# Patient Record
Sex: MALE | Race: WHITE | NOT HISPANIC OR LATINO | Employment: FULL TIME | URBAN - METROPOLITAN AREA
[De-identification: names, ages, dates, MRNs, and addresses within clinical notes are randomized per-mention and may not be internally consistent; named-entity substitution may affect disease eponyms.]

---

## 2017-12-18 RX ORDER — MUPIROCIN 20 MG/G
OINTMENT TOPICAL
COMMUNITY
Start: 2017-10-10 | End: 2017-12-19

## 2017-12-18 RX ORDER — MESALAMINE 1.2 G/1
TABLET, DELAYED RELEASE ORAL
COMMUNITY
Start: 2017-12-13

## 2017-12-18 RX ORDER — HYDROCODONE BITARTRATE AND ACETAMINOPHEN 5; 325 MG/1; MG/1
TABLET ORAL
COMMUNITY
Start: 2017-10-10 | End: 2017-12-19

## 2017-12-18 RX ORDER — AZATHIOPRINE 50 MG/1
TABLET ORAL
COMMUNITY
Start: 2017-12-05

## 2017-12-19 ENCOUNTER — OFFICE VISIT (OUTPATIENT)
Dept: PODIATRY | Facility: CLINIC | Age: 52
End: 2017-12-19
Payer: COMMERCIAL

## 2017-12-19 VITALS — WEIGHT: 195.31 LBS | BODY MASS INDEX: 27.34 KG/M2 | HEIGHT: 71 IN

## 2017-12-19 DIAGNOSIS — M72.2 PLANTAR FASCIITIS OF RIGHT FOOT: ICD-10-CM

## 2017-12-19 DIAGNOSIS — M21.6X2 ACQUIRED EQUINUS DEFORMITY OF BOTH FEET: Primary | ICD-10-CM

## 2017-12-19 DIAGNOSIS — M21.6X1 ACQUIRED EQUINUS DEFORMITY OF BOTH FEET: Primary | ICD-10-CM

## 2017-12-19 DIAGNOSIS — M72.2 PLANTAR FASCIITIS OF LEFT FOOT: ICD-10-CM

## 2017-12-19 PROCEDURE — 99203 OFFICE O/P NEW LOW 30 MIN: CPT | Mod: S$GLB,,, | Performed by: PODIATRIST

## 2017-12-19 PROCEDURE — 99999 PR PBB SHADOW E&M-EST. PATIENT-LVL III: CPT | Mod: PBBFAC,,, | Performed by: PODIATRIST

## 2017-12-19 RX ORDER — DICLOFENAC SODIUM 10 MG/G
2 GEL TOPICAL 4 TIMES DAILY
Qty: 1 TUBE | Refills: 2 | Status: SHIPPED | OUTPATIENT
Start: 2017-12-19 | End: 2018-01-23

## 2017-12-19 NOTE — PATIENT INSTRUCTIONS
1. Stretch calf and plantar fascia 10x per day for 30 sec and before getting out of bed.    2. Supportive shoes at all times (athletic shoe including nava, new balance, asics, HOKA or casual shoes like Dansko, Holley, Naot, Vionoic, Fit flop  clog or wedge with extra heel padding and arch support.    (Varsity sports, Phidippides, [place] running company, Masseys, Goodfeet, Cantilever, Feet First, Foot Solutions, Therapeutic shoes, SAS, [facility] [employer] center pro shop) [email or web address]    3. Orthotic (recommend the following brands: Superfeet, Spenco, Powerstep, Sof Sole Fit Series)              4. NSAID's for 2-3 weeks if no GI or Kidney problems (example: ibuprofen 600 mg 2 x per day)    5. ICE massage with frozen water bottle 2x per day for 30 minutes.    6. Consider night splint, custom orthotics, therapy and/or steroid injection    What Is Plantar Fasciitis?   The plantar fascia is a ligament-like band running from your heel to the ball of your foot. This band pulls on the heel bone, raising the arch of your foot as it pushes off the ground. But if your foot moves incorrectly, the plantar fascia may become strained. The fascia may swell and its tiny fibers may begin to fray, causing plantar fasciitis.  Causes  Plantar fasciitis is often caused by poor foot mechanics. If your foot flattens too much, the fascia may overstretch and swell. If your foot flattens too little, the fascia may ache from being pulled too tight.    The plantar fascia is a thick, fibrous layer of tissue that covers the bones on the bottom of your foot. It holds the foot bones in an arched position. Plantar fasciitis is a painful swelling of the plantar fascia.  A heel spur is an overgrowth of bone where the plantar fascia attaches to the heel bone. The heel spur itself usually doesnt cause pain. However, the heel spur might be a sign of plantar fasciitis which may cause your foot pain. There is no specific treatment for heel  spurs.   Plantar fasciitis can develop slowly or suddenly. It usually affects one foot at a time. Heel pain can feel sharp, like a knife sticking into the bottom of your foot. You may feel pain after exercising, long-distance jogging, stair climbing, long periods of standing, or after standing up.  Risk factors for plantar fasciitis include: arthritis, diabetes, obesity or recent weight gain, flat foot, and having high arches. Wearing high heels, loose shoes, or shoes with poor arch support adds to the risk.    Foot pain is usually worse in the morning. But it improves with walking. By the end of the day there may be a dull aching. Treatment includes short-term rest and controlling inflammation. It may take up to 9 months before all symptoms go away. In rare cases, a steroid injection in the foot, or surgery, may be needed.  Home care  · If you are overweight, lose weight to help healing.  · Choose supportive shoes with good arch support and shock absorbency. Replace athletic shoes when they become worn out. Dont walk or run barefoot.  · Premade or custom-fitted shoe inserts may be helpful. Inserts made of silicone seem to be the most effective. Custom-made inserts can be provided by a podiatrist or foot specialist, physical therapist, or orthopedist.  · Premade or custom-made night splints keep the heel stretched out while you sleep. They may prevent morning pain.  · Avoid activities that stress the feet: jogging, prolonged standing or walking, contact sports, etc.  · First thing in the morning and before sports, stretch the bottom of your foot. Gently flex your ankle so the toes move toward your knee.  · Icing may help control heel pain. Apply an ice pack to the heel for 10-20 minutes as a preventive. Or ice your heel after a severe flare-up of symptoms. You may repeat this every 1-2 hours as needed.  · You may use over-the-counter pain medicine to control pain, unless another medicine was  prescribed. Anti-inflammatory pain medicines, such as ibuprofen or naproxen, may work better than acetaminophen. If you have chronic liver or kidney disease or ever had a stomach ulcer or GI bleeding, talk with your healthcare provider before using these medicines.  · Shoe inserts, a night splint, or a special boot may be needed. Use these as directed by your healthcare provider.      Treating Plantar Fasciitis    First, your doctor relieves pain. Then, the cause of your problem may be found and corrected. If your pain is due to poor foot mechanics, custom-made shoe inserts (orthoses) may help.    Reduce Symptoms:  · To relieve mild symptoms, try aspirin, ibuprofen, or other medications as directed. Rubbing ice on the affected area may also help.  · To reduce severe pain and swelling, your doctor may prescribe pills or injections or a walking cast in some instances. Physical therapy, such as ultrasound or a daily stretching program, may also be recommended. Surgery is rarely required.  · To reduce symptoms caused by poor foot mechanics, your foot may be taped. This supports the arch and temporarily controls movement. Night splints may also help by stretching the fascia.    Control Movement  If taping helps, your doctor may prescribe orthoses. Built from plaster casts of your feet, these inserts control the way your foot moves. As a result, your symptoms should go away.  If Surgery Is Needed  Your doctor may consider surgery if other types of treatment don't control your pain. During surgery, the plantar fascia is partially cut to release tension. As you heal, fibrous tissue fills the space between the heel bone and the plantar fascia.   Reduce Overuse  Every time your foot strikes the ground, the plantar fascia is stretched. You can reduce the strain on the plantar fascia and the possibility of overuse by following these suggestions:  · Lose any excess weight.  · Avoid running on hard or uneven ground.  · Use  orthoses at all times in your shoes and house slippers.  © 7230-0661 [employer]. [address] [place] [address]. All rights reserved. This information is not intended as a substitute for professional medical care. Always follow your healthcare professional's instructions.            Lower Body Exercises: Calf Stretch    This exercise both stretches and strengthens your lower body to help your back. Do the exercise as often as suggested by your health care provider. As you work out, dont rush or strain. Use an exercise mat, pillow, or folded towel to protect your knees and other sensitive areas.  · Face a wall 2 feet away. Step toward the wall with one foot.  · Place both palms on the wall and bend your front knee.  · Lean forward, keeping the back leg straight and the heel on the floor.  · Hold for 20 seconds. Switch legs.  © 6671-3749 [employer]. [address] [place] [address]. All rights reserved. This information is not intended as a substitute for professional medical care. Always follow your healthcare professional's instructions.      These instructions are for your right foot. Switch sides for your left foot.  1. Sit in a chair. Rest your right ankle on your left knee.  2. Hold your toes with your right hand. Gently bend the toes backward. Feel a stretch in the undersides of the toes and ball of the foot. Hold for 30 to 60 seconds.  3. Then gently bend the toes in the other direction. Gently press on them until your foot is pointed. Hold for 30 to 60 seconds.  4. Repeat 5 times, or as instructed.  Date Last Reviewed: 5/1/2016  © 0474-2502 [employer]. [address] [place] [address]. All rights reserved. This information is not intended as a substitute for professional medical care. Always follow your healthcare professional's instructions.

## 2017-12-21 NOTE — PROGRESS NOTES
Subjective:      Patient ID: Devan Celeste is a 52 y.o. male.    Chief Complaint: Heel Pain (both feet x 7 mths) and Other Misc (Dr. Mclean about 5 years ago or more)    Devan is a 52 y.o. male who presents to the clinic complaining of heel pain in both feet, especially with the first step in the morning but also aches more the more he is on it throughout the day. The pain is described as Aching and Sharp. The onset of the pain was gradual and has worsened over the past several weeks. Devan rates the pain as 2/10. He denies a history of trauma. Prior treatments include Rest.    Review of Systems   Constitution: Negative for chills and fever.   Cardiovascular: Negative for claudication and leg swelling.   Respiratory: Negative for shortness of breath.    Skin: Negative for itching, nail changes and rash.   Musculoskeletal: Negative for muscle cramps, muscle weakness and myalgias.        Bilateral heel pain   Gastrointestinal: Negative for nausea and vomiting.   Neurological: Negative for focal weakness, loss of balance, numbness and paresthesias.           Objective:      Physical Exam   Constitutional: He is oriented to person, place, and time. He appears well-developed and well-nourished. No distress.   Cardiovascular:   Pulses:       Dorsalis pedis pulses are 2+ on the right side, and 2+ on the left side.        Posterior tibial pulses are 2+ on the right side, and 2+ on the left side.   < 3 sec capillary refill time to toes 1-5 bilateral. Toes and feet are warm to touch proximally with normal distal cooling b/l. There is some hair growth on the feet and toes b/l. There is no edema b/l. No spider veins or varicosities present b/l.      Musculoskeletal:   Mild pain on palpation plantar medial and central heel bilateral. No pain with ROM or MMT. No pain with medial and lateral compression of heel.    Equinus noted b/l ankles with < 10 deg DF noted. MMT 5/5 in DF/PF/Inv/Ev resistance with no reproduction of pain in  any direction. Passive range of motion of ankle and pedal joints is painless b/l.     Neurological: He is alert and oriented to person, place, and time. He has normal strength. He displays no atrophy and no tremor. No sensory deficit. He exhibits normal muscle tone.   Negative tinel sign bilateral.   Skin: Skin is warm, dry and intact. No abrasion, no bruising, no burn, no ecchymosis, no laceration, no lesion, no petechiae and no rash noted. He is not diaphoretic. No cyanosis or erythema. No pallor. Nails show no clubbing.   Skin temperature, texture and turgor within normal limits.   Psychiatric: He has a normal mood and affect. His behavior is normal.             Assessment:       Encounter Diagnoses   Name Primary?    Acquired equinus deformity of both feet Yes    Plantar fasciitis of right foot     Plantar fasciitis of left foot          Plan:       Devan was seen today for heel pain and other misc.    Diagnoses and all orders for this visit:    Acquired equinus deformity of both feet    Plantar fasciitis of right foot    Plantar fasciitis of left foot    Other orders  -     diclofenac sodium (VOLTAREN) 1 % Gel; Apply 2 g topically 4 (four) times daily.      I counseled the patient on his conditions, their implications and medical management.    Patient will stretch the tendo achilles complex three times daily as demonstrated in the office.  Literature was dispensed illustrating proper stretching technique.    Patient will obtain over the counter arch supports and wear them in shoes whenever possible, can also be worn in his work boots.  Athletic shoes intended for walking or running are usually best.    Patient instructed on adequate icing techniques. Patient should ice the affected area at least once per day x 10 minutes for 10 days . I advised the  patient that extra icing would also be beneficial to ensure adequate anti inflammatory effect     Avoid walking in flats or barefoot    Return in 1 month for  follow up, consider injection, splints and PT if pain persists or worsens.    Hao Lopes DPM

## 2018-01-23 ENCOUNTER — OFFICE VISIT (OUTPATIENT)
Dept: PODIATRY | Facility: CLINIC | Age: 53
End: 2018-01-23
Payer: COMMERCIAL

## 2018-01-23 VITALS — HEIGHT: 71 IN | WEIGHT: 195.56 LBS | BODY MASS INDEX: 27.38 KG/M2

## 2018-01-23 DIAGNOSIS — M72.2 PLANTAR FASCIITIS OF RIGHT FOOT: ICD-10-CM

## 2018-01-23 DIAGNOSIS — M21.6X1 ACQUIRED EQUINUS DEFORMITY OF BOTH FEET: Primary | ICD-10-CM

## 2018-01-23 DIAGNOSIS — M72.2 PLANTAR FASCIITIS OF LEFT FOOT: ICD-10-CM

## 2018-01-23 DIAGNOSIS — M21.6X2 ACQUIRED EQUINUS DEFORMITY OF BOTH FEET: Primary | ICD-10-CM

## 2018-01-23 PROCEDURE — 99999 PR PBB SHADOW E&M-EST. PATIENT-LVL III: CPT | Mod: PBBFAC,,, | Performed by: PODIATRIST

## 2018-01-23 PROCEDURE — 20550 NJX 1 TENDON SHEATH/LIGAMENT: CPT | Mod: 50,S$GLB,, | Performed by: PODIATRIST

## 2018-01-23 PROCEDURE — 99499 UNLISTED E&M SERVICE: CPT | Mod: S$GLB,,, | Performed by: PODIATRIST

## 2018-01-23 RX ORDER — TRIAMCINOLONE ACETONIDE 40 MG/ML
40 INJECTION, SUSPENSION INTRA-ARTICULAR; INTRAMUSCULAR ONCE
Status: COMPLETED | OUTPATIENT
Start: 2018-01-23 | End: 2018-01-23

## 2018-01-23 RX ORDER — BACITRACIN 500 [USP'U]/G
OINTMENT TOPICAL 2 TIMES DAILY
COMMUNITY

## 2018-01-23 RX ORDER — DEXAMETHASONE SODIUM PHOSPHATE 4 MG/ML
4 INJECTION, SOLUTION INTRA-ARTICULAR; INTRALESIONAL; INTRAMUSCULAR; INTRAVENOUS; SOFT TISSUE
Status: COMPLETED | OUTPATIENT
Start: 2018-01-23 | End: 2018-01-23

## 2018-01-23 RX ADMIN — TRIAMCINOLONE ACETONIDE 40 MG: 40 INJECTION, SUSPENSION INTRA-ARTICULAR; INTRAMUSCULAR at 04:01

## 2018-01-23 RX ADMIN — DEXAMETHASONE SODIUM PHOSPHATE 4 MG: 4 INJECTION, SOLUTION INTRA-ARTICULAR; INTRALESIONAL; INTRAMUSCULAR; INTRAVENOUS; SOFT TISSUE at 04:01

## 2018-01-27 NOTE — PROGRESS NOTES
Subjective:      Patient ID: Devan Celeste is a 52 y.o. male.    Chief Complaint: Follow-up (1 mo re-ck - slight improvement); Heel Pain (shyla); and Other Misc (PCP:  Dr Mclean  about 5 years ago)    Devan is a 52 y.o. male who presents to the clinic complaining of heel pain in both feet, especially with the first step in the morning but also aches more the more he is on it throughout the day. The pain is described as Aching and Sharp. The onset of the pain was gradual and has worsened over the past several weeks. Devan rates the pain as 2/10. He denies a history of trauma. Prior treatments include Rest.    1/23/18: Patient was seen for follow up for bilateral plantar fasciitis. He relates getting new shoes and inserts and stretching, there has been little improvement over the last month, still wears his work boots and even with the inserts they hurt his heels. No new pedal complaints at this time.    Review of Systems   Constitution: Negative for chills and fever.   Cardiovascular: Negative for claudication and leg swelling.   Respiratory: Negative for shortness of breath.    Skin: Negative for itching, nail changes and rash.   Musculoskeletal: Negative for muscle cramps, muscle weakness and myalgias.        Bilateral heel pain   Gastrointestinal: Negative for nausea and vomiting.   Neurological: Negative for focal weakness, loss of balance, numbness and paresthesias.           Objective:      Physical Exam   Constitutional: He is oriented to person, place, and time. He appears well-developed and well-nourished. No distress.   Cardiovascular:   Pulses:       Dorsalis pedis pulses are 2+ on the right side, and 2+ on the left side.        Posterior tibial pulses are 2+ on the right side, and 2+ on the left side.   < 3 sec capillary refill time to toes 1-5 bilateral. Toes and feet are warm to touch proximally with normal distal cooling b/l. There is some hair growth on the feet and toes b/l. There is no edema b/l. No  spider veins or varicosities present b/l.      Musculoskeletal:   Mild pain on palpation plantar medial and central heel bilateral. No pain with ROM or MMT. No pain with medial and lateral compression of heel.    Equinus noted b/l ankles with < 10 deg DF noted. MMT 5/5 in DF/PF/Inv/Ev resistance with no reproduction of pain in any direction. Passive range of motion of ankle and pedal joints is painless b/l.     Neurological: He is alert and oriented to person, place, and time. He has normal strength. He displays no atrophy and no tremor. No sensory deficit. He exhibits normal muscle tone.   Negative tinel sign bilateral.   Skin: Skin is warm, dry and intact. No abrasion, no bruising, no burn, no ecchymosis, no laceration, no lesion, no petechiae and no rash noted. He is not diaphoretic. No cyanosis or erythema. No pallor. Nails show no clubbing.   Skin temperature, texture and turgor within normal limits.   Psychiatric: He has a normal mood and affect. His behavior is normal.             Assessment:       Encounter Diagnoses   Name Primary?    Acquired equinus deformity of both feet Yes    Plantar fasciitis of right foot     Plantar fasciitis of left foot          Plan:       Devan was seen today for follow-up, heel pain and other misc.    Diagnoses and all orders for this visit:    Acquired equinus deformity of both feet    Plantar fasciitis of right foot    Plantar fasciitis of left foot    Other orders  -     dexamethasone injection 4 mg; Inject 1 mL (4 mg total) into the muscle one time.  -     triamcinolone acetonide injection 40 mg; Inject 1 mL (40 mg total) into the muscle once.      I counseled the patient on his conditions, their implications and medical management.    Patient will continue to stretch the tendo achilles complex three times daily as demonstrated in the office.  Literature was dispensed illustrating proper stretching technique.    Patient will wear the over the counter arch supports and  wear them in shoes whenever possible, can also be worn in his work boots.  Athletic shoes intended for walking or running are usually best.    Patient instructed on adequate icing techniques. Patient should ice the affected area at least once per day x 10 minutes for 10 days . I advised the  patient that extra icing would also be beneficial to ensure adequate anti inflammatory effect     Avoid walking in flats or barefoot    Conservative vs surgical treatment options for Plantar Fasciitis were explained in detail. Today the patient received an injection in plantar bilateral heels. The area was prepped with alcohol, skin anesthestized with cold spray and a mixture of 20 mg kenalog, 2mg dexamethasone 1 mL 1% plain lidocaine was injected into each heel. The patient tolerated the procedure well. Instructed to rest, ice and elevate.    Offered prescription for PT which he turned down.    Return in 1 month for follow up, consider, splints and PT if pain persists or worsens.    Hao Lopes DPM

## 2018-04-30 ENCOUNTER — HOSPITAL ENCOUNTER (OUTPATIENT)
Dept: RADIOLOGY | Facility: CLINIC | Age: 53
Discharge: HOME | End: 2018-04-30
Attending: PODIATRIST
Payer: COMMERCIAL

## 2018-04-30 ENCOUNTER — OFFICE VISIT (OUTPATIENT)
Dept: PODIATRY | Facility: CLINIC | Age: 53
End: 2018-04-30
Payer: COMMERCIAL

## 2018-04-30 VITALS — HEIGHT: 71 IN | WEIGHT: 199.5 LBS | BODY MASS INDEX: 27.93 KG/M2

## 2018-04-30 DIAGNOSIS — M21.6X1 ACQUIRED EQUINUS DEFORMITY OF BOTH FEET: ICD-10-CM

## 2018-04-30 DIAGNOSIS — M21.6X2 ACQUIRED EQUINUS DEFORMITY OF BOTH FEET: ICD-10-CM

## 2018-04-30 DIAGNOSIS — M72.2 PLANTAR FASCIITIS OF RIGHT FOOT: Primary | ICD-10-CM

## 2018-04-30 DIAGNOSIS — M79.671 FOOT PAIN, RIGHT: ICD-10-CM

## 2018-04-30 DIAGNOSIS — M72.2 PLANTAR FASCIITIS OF RIGHT FOOT: ICD-10-CM

## 2018-04-30 PROCEDURE — 20550 NJX 1 TENDON SHEATH/LIGAMENT: CPT | Mod: RT,S$GLB,, | Performed by: PODIATRIST

## 2018-04-30 PROCEDURE — 99213 OFFICE O/P EST LOW 20 MIN: CPT | Mod: 25,S$GLB,, | Performed by: PODIATRIST

## 2018-04-30 PROCEDURE — 99999 PR PBB SHADOW E&M-EST. PATIENT-LVL III: CPT | Mod: PBBFAC,,, | Performed by: PODIATRIST

## 2018-04-30 PROCEDURE — 73630 X-RAY EXAM OF FOOT: CPT | Mod: TC,FY,PO,RT

## 2018-04-30 PROCEDURE — 73630 X-RAY EXAM OF FOOT: CPT | Mod: 26,RT,S$GLB, | Performed by: RADIOLOGY

## 2018-04-30 PROCEDURE — 29540 STRAPPING ANKLE &/FOOT: CPT | Mod: 59,RT,S$GLB, | Performed by: PODIATRIST

## 2018-04-30 RX ORDER — DEXAMETHASONE SODIUM PHOSPHATE 4 MG/ML
4 INJECTION, SOLUTION INTRA-ARTICULAR; INTRALESIONAL; INTRAMUSCULAR; INTRAVENOUS; SOFT TISSUE
Status: COMPLETED | OUTPATIENT
Start: 2018-04-30 | End: 2018-04-30

## 2018-04-30 RX ORDER — ESOMEPRAZOLE MAGNESIUM 40 MG/1
CAPSULE, DELAYED RELEASE ORAL
COMMUNITY

## 2018-04-30 RX ORDER — METHYLPREDNISOLONE 4 MG/1
TABLET ORAL
Qty: 1 PACKAGE | Refills: 0 | Status: SHIPPED | OUTPATIENT
Start: 2018-04-30 | End: 2018-06-11 | Stop reason: ALTCHOICE

## 2018-04-30 RX ORDER — TRIAMCINOLONE ACETONIDE 40 MG/ML
40 INJECTION, SUSPENSION INTRA-ARTICULAR; INTRAMUSCULAR
Status: COMPLETED | OUTPATIENT
Start: 2018-04-30 | End: 2018-04-30

## 2018-04-30 RX ADMIN — TRIAMCINOLONE ACETONIDE 40 MG: 40 INJECTION, SUSPENSION INTRA-ARTICULAR; INTRAMUSCULAR at 03:04

## 2018-04-30 RX ADMIN — DEXAMETHASONE SODIUM PHOSPHATE 4 MG: 4 INJECTION, SOLUTION INTRA-ARTICULAR; INTRALESIONAL; INTRAMUSCULAR; INTRAVENOUS; SOFT TISSUE at 03:04

## 2018-04-30 NOTE — PROGRESS NOTES
Subjective:      Patient ID: Devan Celeste is a 52 y.o. male.    Chief Complaint: Foot Pain (right)    Sharp deep pain bottom right heel.  Gradual onset, worsening over past several weeks, aggravated by increased weight bearing, shoe gear, pressure. Inserts, athletic shoes, intermittent stretches and ice minimal help.  Injection 1/23/2018 helped for about a week.  OTC pain med not helping.  Denies trauma, surgery right foot.  No films.    Review of Systems   Constitution: Negative for chills, diaphoresis, fever, malaise/fatigue and night sweats.   Cardiovascular: Negative for claudication, cyanosis, leg swelling and syncope.   Skin: Negative for color change, dry skin, nail changes, rash, suspicious lesions and unusual hair distribution.   Musculoskeletal: Negative for falls, joint pain, joint swelling, muscle cramps, muscle weakness and stiffness.   Gastrointestinal: Negative for constipation, diarrhea, nausea and vomiting.   Neurological: Negative for brief paralysis, disturbances in coordination, focal weakness, numbness, paresthesias, sensory change and tremors.           Objective:      Physical Exam   Constitutional: He is oriented to person, place, and time. He appears well-developed and well-nourished. He is cooperative. No distress.   Cardiovascular:   Pulses:       Popliteal pulses are 2+ on the right side, and 2+ on the left side.        Dorsalis pedis pulses are 2+ on the right side, and 2+ on the left side.        Posterior tibial pulses are 2+ on the right side, and 2+ on the left side.   Capillary refill 3 seconds all toes/distal feet, all toes/both feet warm to touch.      Negative lymphadenopathy bilateral popliteal fossa and tarsal tunnel.      Negavie lower extremity edema bilateral.     Musculoskeletal:        Right ankle: He exhibits normal range of motion, no swelling, no ecchymosis, no deformity, no laceration and normal pulse. Achilles tendon normal. Achilles tendon exhibits no pain, no  defect and normal Jean's test results.   Sharp deep pain to palpation inferior heel right at medial calcaneal tubercle without ecchymosis, erythema, edema, or cardinal signs infection, and no signs of trauma.    Ankle dorsiflexion decreased at <10 degrees bilateral with moderate increase with knee flexion bilateral.    Otherwise, Normal angle, base, station of gait. All ten toes without clubbing, cyanosis, or signs of ischemia.  No pain to palpation bilateral lower extremities.  Range of motion, stability, muscle strength, and muscle tone normal bilateral feet and legs.     Lymphadenopathy: No inguinal adenopathy noted on the right or left side.   Negative lymphadenopathy bilateral popliteal fossa and tarsal tunnel.    Negative lymphangitic streaking bilateral feet/ankles/legs.   Neurological: He is alert and oriented to person, place, and time. He has normal strength. He displays no atrophy and no tremor. No sensory deficit. He exhibits normal muscle tone. Gait normal.   Reflex Scores:       Patellar reflexes are 2+ on the right side and 2+ on the left side.       Achilles reflexes are 2+ on the right side and 2+ on the left side.  Negative tinel sign to percussion sural, superficial peroneal, deep peroneal, saphenous, and posterior tibial nerves right and left ankles and feet.     Skin: Skin is warm, dry and intact. Capillary refill takes 2 to 3 seconds. No abrasion, no bruising, no burn, no ecchymosis, no laceration, no lesion and no rash noted. He is not diaphoretic. No cyanosis or erythema. No pallor. Nails show no clubbing.     Skin is normal age and health appropriate color, turgor, texture, and temperature bilateral lower extremities without ulceration, hyperpigmentation, discoloration, masses nodules or cords palpated.  No ecchymosis, erythema, edema, or cardinal signs of infection bilateral lower extremities.     Psychiatric: He has a normal mood and affect.             Assessment:       Encounter  Diagnoses   Name Primary?    Plantar fasciitis of right foot Yes    Acquired equinus deformity of both feet     Foot pain, right          Plan:       Devan was seen today for foot pain.    Diagnoses and all orders for this visit:    Plantar fasciitis of right foot  -     ORTHOTIC DEVICE (DME)  -     Ambulatory consult to Physical Therapy  -     X-Ray Foot Complete Right; Future    Acquired equinus deformity of both feet  -     ORTHOTIC DEVICE (DME)  -     Ambulatory consult to Physical Therapy  -     X-Ray Foot Complete Right; Future    Foot pain, right  -     ORTHOTIC DEVICE (DME)  -     Ambulatory consult to Physical Therapy  -     X-Ray Foot Complete Right; Future    Other orders  -     methylPREDNISolone (MEDROL DOSEPACK) 4 mg tablet; use as directed  -     dexamethasone injection 4 mg; Inject 1 mL (4 mg total) into the vein one time.  -     triamcinolone acetonide injection 40 mg; 1 mL (40 mg total) by Other route one time.      I counseled the patient on his conditions, their implications and medical management.        Patient will stretch the tendo achilles complex three times daily as demonstrated in the office.  Literature was dispensed illustrating proper stretching technique.    I applied a plantar rest strapping to the patient's right foot to offload symptomatic area, support the arch, and relieve pain.    Patient will obtain over the counter arch supports and wear them in shoes whenever possible.  Athletic shoes intended for walking or running are usually best.    Discussed conservative treatment with shoes of adequate dimensions, material, and style to alleviate symptoms and delay or prevent surgical intervention.    Rx xrays, medrol dose pack, custom orthotics, PT    Counseled the patient on the potential complications of local injection of corticosteroid including, but not limited to:  Discoloration of skin, erosion of soft tissue, and increased likelihood of rupture of a soft tissue structure  (ie. Plantar fascia, muscle, tendon, ligament, or capsule in the area of injection).  Patient indicates understanding of my explanation, that any questions have been answered to patient satisfaction, and patient gives verbal consent for injection of affected area.    After sterile skin prep, steroid injection was performed with patient verbal consent and timeout procedure with patient identifiers, site markings, and procedures in agreement to all present, at right plantar fascia using 20 mg of Kenalog, 4mg dexamethazone sodium phosphate, and 1mL 1% Lidocaine plain. This was well tolerated.    Dispense nigh spilnt right.          Follow-up in about 6 weeks (around 6/11/2018).

## 2018-06-11 ENCOUNTER — OFFICE VISIT (OUTPATIENT)
Dept: PODIATRY | Facility: CLINIC | Age: 53
End: 2018-06-11
Payer: COMMERCIAL

## 2018-06-11 VITALS — BODY MASS INDEX: 28.11 KG/M2 | WEIGHT: 200.81 LBS | HEIGHT: 71 IN

## 2018-06-11 DIAGNOSIS — M79.671 FOOT PAIN, RIGHT: ICD-10-CM

## 2018-06-11 DIAGNOSIS — M72.2 PLANTAR FASCIITIS: Primary | ICD-10-CM

## 2018-06-11 DIAGNOSIS — Q66.70 PES CAVUS: ICD-10-CM

## 2018-06-11 PROCEDURE — 3008F BODY MASS INDEX DOCD: CPT | Mod: CPTII,S$GLB,, | Performed by: PODIATRIST

## 2018-06-11 PROCEDURE — 99213 OFFICE O/P EST LOW 20 MIN: CPT | Mod: S$GLB,,, | Performed by: PODIATRIST

## 2018-06-11 PROCEDURE — 99999 PR PBB SHADOW E&M-EST. PATIENT-LVL III: CPT | Mod: PBBFAC,,, | Performed by: PODIATRIST

## 2018-06-11 NOTE — PROGRESS NOTES
Subjective:      Patient ID: Devan Celeste is a 52 y.o. male.    Chief Complaint: Foot Pain (f/u right foot pain )    Sharp deep pain bottom right heel.  Gradual onset, worsening over past several weeks, aggravated by increased weight bearing, shoe gear, pressure. Inserts, athletic shoes, intermittent stretches and ice minimal help.  Injection 1/23/2018 and 4/30/18 helped for about a week.  OTC pain med not helping.  Medrol helped short term.  Awaiting custom orthotic dispense.  Did not attend PT.  Denies trauma, surgery right foot.  xrays negative acute injury, positive inferior heel spur.    Review of Systems   Constitution: Negative for chills, diaphoresis, fever, malaise/fatigue and night sweats.   Cardiovascular: Negative for claudication, cyanosis, leg swelling and syncope.   Skin: Negative for color change, dry skin, nail changes, rash, suspicious lesions and unusual hair distribution.   Musculoskeletal: Negative for falls, joint pain, joint swelling, muscle cramps, muscle weakness and stiffness.   Gastrointestinal: Negative for constipation, diarrhea, nausea and vomiting.   Neurological: Negative for brief paralysis, disturbances in coordination, focal weakness, numbness, paresthesias, sensory change and tremors.           Objective:      Physical Exam   Constitutional: He is oriented to person, place, and time. He appears well-developed and well-nourished. He is cooperative. No distress.   Cardiovascular:   Pulses:       Popliteal pulses are 2+ on the right side, and 2+ on the left side.        Dorsalis pedis pulses are 2+ on the right side, and 2+ on the left side.        Posterior tibial pulses are 2+ on the right side, and 2+ on the left side.   Capillary refill 3 seconds all toes/distal feet, all toes/both feet warm to touch.      Negative lymphadenopathy bilateral popliteal fossa and tarsal tunnel.      Negavie lower extremity edema bilateral.     Musculoskeletal:        Right ankle: He exhibits  normal range of motion, no swelling, no ecchymosis, no deformity, no laceration and normal pulse. Achilles tendon normal. Achilles tendon exhibits no pain, no defect and normal Jean's test results.   Sharp deep pain to palpation inferior heel right at medial calcaneal tubercle without ecchymosis, erythema, edema, or cardinal signs infection, and no signs of trauma.    Ankle dorsiflexion decreased at <10 degrees bilateral with moderate increase with knee flexion bilateral.    Otherwise, Normal angle, base, station of gait. All ten toes without clubbing, cyanosis, or signs of ischemia.  No pain to palpation bilateral lower extremities.  Range of motion, stability, muscle strength, and muscle tone normal bilateral feet and legs.     Lymphadenopathy: No inguinal adenopathy noted on the right or left side.   Negative lymphadenopathy bilateral popliteal fossa and tarsal tunnel.    Negative lymphangitic streaking bilateral feet/ankles/legs.   Neurological: He is alert and oriented to person, place, and time. He has normal strength. He displays no atrophy and no tremor. No sensory deficit. He exhibits normal muscle tone. Gait normal.   Reflex Scores:       Patellar reflexes are 2+ on the right side and 2+ on the left side.       Achilles reflexes are 2+ on the right side and 2+ on the left side.  Negative tinel sign to percussion sural, superficial peroneal, deep peroneal, saphenous, and posterior tibial nerves right and left ankles and feet.     Skin: Skin is warm, dry and intact. Capillary refill takes 2 to 3 seconds. No abrasion, no bruising, no burn, no ecchymosis, no laceration, no lesion and no rash noted. He is not diaphoretic. No cyanosis or erythema. No pallor. Nails show no clubbing.     Skin is normal age and health appropriate color, turgor, texture, and temperature bilateral lower extremities without ulceration, hyperpigmentation, discoloration, masses nodules or cords palpated.  No ecchymosis, erythema,  edema, or cardinal signs of infection bilateral lower extremities.     Psychiatric: He has a normal mood and affect.             Assessment:       Encounter Diagnoses   Name Primary?    Plantar fasciitis Yes    Foot pain, right     Pes cavus          Plan:       Devan was seen today for foot pain.    Diagnoses and all orders for this visit:    Plantar fasciitis  -     Ambulatory consult to Physical Therapy    Foot pain, right  -     Ambulatory consult to Physical Therapy    Pes cavus  -     Ambulatory consult to Physical Therapy      I counseled the patient on his conditions, their implications and medical management.        Patient will stretch the tendo achilles complex three times daily as demonstrated in the office.  Literature was dispensed illustrating proper stretching technique.    Continue night splint, otc inserts,athletic shoes, stretches, prn nsaid    Schedule and attend PT - new Rx today.    Await custom orthotic dispense.    Counseled on conservative treatments including custom orthotics, shoe and activity change, physical therapy, antiinflammatory, and pain medication, and sometimes injections for symptomatic relief.    Counseled on surgical correction,- endoscopic plantar fascia release - risks and benefits, including, but not limited to recurrence, infection, pain, scar, poor cosmesis, loss of function, arthritis, healing in poor position, new or different ulceration or pre ulcerative callus, nerve pain, nerve damage, numbness, syndromes (RSD), need for future surgical procedures, and or long term use of orthotics, blood clots of leg, lung, heart, brain, death.    Consider carefully, appoint with pcp for surgical clearance, follow here prn as symptoms dictate for consent, post op Rx, and scheduling.    Declines fx boot and casting.        Follow-up in about 6 weeks (around 7/23/2018).

## 2018-06-25 ENCOUNTER — CLINICAL SUPPORT (OUTPATIENT)
Dept: REHABILITATION | Facility: HOSPITAL | Age: 53
End: 2018-06-25
Attending: PODIATRIST
Payer: COMMERCIAL

## 2018-06-25 DIAGNOSIS — R26.9 GAIT ABNORMALITY: ICD-10-CM

## 2018-06-25 PROCEDURE — 97161 PT EVAL LOW COMPLEX 20 MIN: CPT | Mod: PN

## 2018-06-25 NOTE — PLAN OF CARE
"TIME RECORD    06/25/2018    Start Time:  1715   Stop Time:  1800    PROCEDURES:    TIMED  Procedure Time Min.    Start:  Stop:     Start:  Stop:     Start:  Stop:     Start:  Stop:          UNTIMED  Procedure Time Min.   Evaluation Start:  Stop:     Start:  Stop:      Total Timed Minutes:  0  Total Timed Units:  0  Total Untimed Units:  1  Charges Billed/# of units:  1    OUTPATIENT PHYSICAL THERAPY   PATIENT EVALUATION  Onset Date: 6 months   Problem List Items Addressed This Visit     Gait abnormality          Medical Diagnosis:   M72.2 (ICD-10-CM) - Plantar fasciitis   M79.671 (ICD-10-CM) - Foot pain, right   Q66.7 (ICD-10-CM) - Pes cavus       Treatment Diagnosis: gait abnormality    No past medical history on file.    Past Surgical History:   Procedure Laterality Date    GALLBLADDER SURGERY      OTHER SURGICAL HISTORY         has a current medication list which includes the following prescription(s): azathioprine, bacitracin, esomeprazole, and mesalamine.    Precautions: standard  Surgical history: see above  Prior Therapy: no  History of Present Illness: Pt with insidious onset R>L foot pain about 6 months ago. Treatment so far has included 2 injections with minimal effect, custom orthotics, and stretches. Dr. Barnes has ordered PT for right foot.    Prior Level of Function: Independent  Social History: works as  at [email or web address]; job requires him to be on his feet most of the day. Wears work boots with inserts.      Current level of function: Independent  Functional Deficits Leading to Referral/Nature of Injury: weakness, impaired endurance, impaired functional mobility, decreased lower extremity function, pain and decreased ROM  Patient Therapy Goals: Improve pain/function      Subjective     Devan Celeste states "I don't know how therapy is going to help." Reports custom orthotics have caused some increased pain. States he has been compliant with stretches and has tried using a frozen water bottle but " they have not helped.    Pain:  Location:  Posterior heel R>L footPosterior heel   Description: like stepping, walking  Activities Which Increase Pain:   Activities Which Decrease Pain: rest,   Pain Scale: 0/10 at best 1-2/10 now  6/10 at worst        Objective     Posture/Appearance: Fwd head position, rounded shoulders  Palpation:TTP R posterior heel and plantar fascia, especially at medial calcalneal turbercle. Similar pattern of tenderness on L foot.  Sensation: Intact  DTRs:  NT  Range of Motion/Strength:      Ankle Right  Left  Pain/Dysfunction with Movement    PROM MMT PROM MMT    Dorsiflexion 2* 5/5 4* 5/5    Plantarflexion 40* 4+/5 40* 4+/5    Inversion 36* 4+/5 34* 4+/5    Eversion 15* 4+/5 16* 4+/5        Flexibility: ROM as per above, tightness bilateral gastrocs, soleus  Gait: Antalgic with decreased heel strike and weight shift/stance time RLE  Bed Mobility: Supine<>sit independent  Transfers: Sit<>stand independent  Functional Outcome Measure: Lower Extremity Functional Scale (LEFS): 52/80=35% impairment  Treatment: Educated on role/goal PT, POC. Instructed pt in continuation of calf stretches issued in Dr. Barnes's office, however to perform more gently. Pt verbalizing understanding and agreement.     Assessment       Initial Assessment (Pertinent finding, problem list and factors affecting outcome): Patient presents to PT with c/o right foot pain for the past 6 months. Notable impairments include weakness, impaired endurance, decreased lower extremity function, pain and decreased ROM, and impaired functional mobility. Patient would benefit from skilled PT to address notable impairments and improve functional mobility to OF.      Rehab Potiential: good      Short Term Goals (3 Weeks): 1) Pt will initiate HEP 2) Patient will improve ROM DF by 2-4*    Long Term Goals (6 Weeks): 1) Pt will be I with HEP 2) Pt will return to community ambulation with pain <3/10 3) Pt will improve LEFS to <20%  impairment    Plan     Certification Period: 06/25/2018 to 08/03/18  Recommended Treatment Plan: 2 times per week for 4-6 weeks: Electrical Stimulation PRN, Fluidotherapy, Manual Therapy, Moist Heat/ Ice, Neuromuscular Re-ed, Orthotic Management and Training, Patient Education, Therapeutic Activites, Therapeutic Exercise, Ultrasound and dry needling PRN      Thank you for referral.    Therapist: Catie Nettles, PT    I CERTIFY THE NEED FOR THESE SERVICES FURNISHED UNDER THIS PLAN OF TREATMENT AND WHILE UNDER MY CARE    Physician's comments: ________________________________________________________________________________________________________________________________________________      Physician's Name: ___________________________________

## 2018-06-30 PROBLEM — R26.9 GAIT ABNORMALITY: Status: ACTIVE | Noted: 2018-06-30

## 2018-07-02 ENCOUNTER — CLINICAL SUPPORT (OUTPATIENT)
Dept: REHABILITATION | Facility: HOSPITAL | Age: 53
End: 2018-07-02
Attending: PODIATRIST
Payer: COMMERCIAL

## 2018-07-02 DIAGNOSIS — R26.9 GAIT ABNORMALITY: ICD-10-CM

## 2018-07-02 PROCEDURE — 97110 THERAPEUTIC EXERCISES: CPT | Mod: PN

## 2018-07-02 PROCEDURE — 97140 MANUAL THERAPY 1/> REGIONS: CPT | Mod: PN

## 2018-07-02 NOTE — PROGRESS NOTES
"Name: Devan Celeste  Clinic Number: [id number]  Date of Treatment: 07/02/2018   Diagnosis:   Encounter Diagnosis   Name Primary?    Gait abnormality        Time in: 1700  Time Out: 1743  Total Treatment Time: 43  Group Time: 0      Subjective:    Devan reports continued B heel pain R>L. Patient reports doing plantar fascia stretches with towel and tennis ball without relief, as well as shoe inserts.  Patient reports their pain to be 6/10 on a 0-10 scale with 0 being no pain and 10 being the worst pain imaginable.    Objective    Devan received therapeutic exercises to develop strength, endurance and flexibility for 28 minutes including:     Bike Lv1 10'  Hamstring stretch 3x30" sit with stool R/L  Plantar fascia stretch 3x30 sit with towel R/L  Gastroc stretch 3x30" 1/2 roll R/L  Soleus stretch 3x30" 1/2 roll B    Devan received the following manual therapy techniques: Soft tissue Mobilization were applied to the: B plantar feet and heels for 15 minutes.     Gait training 100' with VC to increase DF for heel strike on R as well as roll to toe off and taking a larger step    Pt demo good understanding of the education provided. Devan demonstrated good return demonstration of activities.     Assessment:     Pt will continue to benefit from skilled PT intervention. Medical Necessity is demonstrated by:  Pain limits function of effected part for some activities, Unable to participate fully in daily activities, Requires skilled supervision to complete and progress HEP and Weakness.    Plan:    Continue with established Plan of Care towards PT goals.   "

## 2018-07-05 ENCOUNTER — CLINICAL SUPPORT (OUTPATIENT)
Dept: REHABILITATION | Facility: HOSPITAL | Age: 53
End: 2018-07-05
Attending: PODIATRIST
Payer: COMMERCIAL

## 2018-07-05 DIAGNOSIS — R26.9 GAIT ABNORMALITY: ICD-10-CM

## 2018-07-05 PROCEDURE — 97110 THERAPEUTIC EXERCISES: CPT | Mod: PN

## 2018-07-05 PROCEDURE — 97140 MANUAL THERAPY 1/> REGIONS: CPT | Mod: PN

## 2018-07-05 NOTE — PROGRESS NOTES
Name: Devan Celeste  Clinic Number: [id number]  Date of Treatment: 07/05/2018   Diagnosis:   Encounter Diagnosis   Name Primary?    Gait abnormality        Time in: 1658  Time Out: 1744  Total Treatment Time: 46      Subjective:    Devan reports no changes.  Patient reports their pain to be 6/10 on a 0-10 scale with 0 being no pain and 10 being the worst pain imaginable.    Objective     Patient received individual therapy to increase strength and flexibility with activities as follows:     Devan received therapeutic exercises to develop strength and flexibility for 31 minutes including:     Nustep level 3 x 10 minutes  Standing gastroc stretch 3 x 30 sec B  Soleus stretch 3 x 30 sec B  Towel scrunch x 30  Hamstring stretch 3 x 30 sec B    Devan received the following manual therapy techniques: MFR with therapy bar applied to R gastroc, heel, plantar surface, ankle joint mobs anteriorly, PROM R/L heel for 10 minutes.  Kinesiotaping R heel region, gastroc inhibition I strip, foot placed in passive dorsiflexion, no tension on anchors, 65% tension up achilles region.  Instructions given for proper tape removal.    Written Home Exercises Provided: cont HEP  Pt demo good understanding of the education provided. Devan demonstrated good return demonstration of activities.     Assessment:     Pt stated minimal relief after therapy and taping.    Pt will continue to benefit from skilled PT intervention. Medical Necessity is demonstrated by:  Pain limits function of effected part for some activities, Unable to participate fully in daily activities, Requires skilled supervision to complete and progress HEP and Weakness.    Patient is making fair progress towards established goals.      Plan:  Continue with established Plan of Care towards PT goals.

## 2018-07-09 ENCOUNTER — CLINICAL SUPPORT (OUTPATIENT)
Dept: REHABILITATION | Facility: HOSPITAL | Age: 53
End: 2018-07-09
Attending: PODIATRIST
Payer: COMMERCIAL

## 2018-07-09 DIAGNOSIS — R26.9 GAIT ABNORMALITY: ICD-10-CM

## 2018-07-09 PROCEDURE — 97110 THERAPEUTIC EXERCISES: CPT | Mod: PN

## 2018-07-09 PROCEDURE — 97140 MANUAL THERAPY 1/> REGIONS: CPT | Mod: PN

## 2018-07-09 PROCEDURE — 97035 APP MDLTY 1+ULTRASOUND EA 15: CPT | Mod: PN

## 2018-07-09 NOTE — PROGRESS NOTES
"Name: Devan Celeste  Clinic Number: [id number]  Date of Treatment: 07/09/2018   Diagnosis:   Encounter Diagnosis   Name Primary?    Gait abnormality        Time in: 1700  Time Out: 1750  Total Treatment Time: 50  Group Time: 0      Subjective:    Devan reports continued B heel pain R>L.  Patient reports their pain to be 4-5/10 on a 0-10 scale with 0 being no pain and 10 being the worst pain imaginable.    Objective    Devan received therapeutic exercises to develop strength, endurance and flexibility for 24 minutes including:     Bike Lv3 10'  Gastroc stretch 3x30" 1/2 roll R/L  Soleus stretch 3x30" 1/2 roll R/L        Devan received the following manual therapy techniques: Soft tissue Mobilization were applied to the: B heels with thera roller for 10 minutes.     The patient received the following direct contact modalities after being cleared for contraindications: Ultrasound:  Devan received ultrasound to manage pain and inflammation at 100 % duty cycle applied to the R and L heels at an intensity of 1.0 W/cm2  for a duration of 16 minutes. Patient tolerated treatment well without adverse effects. Therapist was in attendance throughout intervention.    Pt demo good understanding of the education provided. Devan demonstrated good return demonstration of activities.     Assessment:     Pt will continue to benefit from skilled PT intervention. Medical Necessity is demonstrated by:  Pain limits function of effected part for some activities, Unable to participate fully in daily activities, Requires skilled supervision to complete and progress HEP and Weakness.    Plan:    Continue with established Plan of Care towards PT goals.   "

## 2018-07-16 ENCOUNTER — CLINICAL SUPPORT (OUTPATIENT)
Dept: REHABILITATION | Facility: HOSPITAL | Age: 53
End: 2018-07-16
Attending: PODIATRIST
Payer: COMMERCIAL

## 2018-07-16 DIAGNOSIS — R26.9 GAIT ABNORMALITY: ICD-10-CM

## 2018-07-16 PROCEDURE — 97035 APP MDLTY 1+ULTRASOUND EA 15: CPT | Mod: PN

## 2018-07-16 PROCEDURE — 97140 MANUAL THERAPY 1/> REGIONS: CPT | Mod: PN

## 2018-07-16 PROCEDURE — 97110 THERAPEUTIC EXERCISES: CPT | Mod: PN

## 2018-07-16 NOTE — PROGRESS NOTES
Name: Devan Celeste  Date:   07/16/2018  Primary Diagnosis: .  Problem List Items Addressed This Visit     Gait abnormality          Time in: 1704  Time Out: 1756  Total Treatment Time: 52  Group Time: 0  No charge: 5 min      Subjective:    Patient reports improvement of symptoms. Reports he received US last treatment session and noticed a small improvement in pain the next day.  Patient reports their pain to be 3/10 on a 0-10 scale with 0 being no pain and 10 being the worst pain imaginable.    Objective    Patient received individual therapy to address strength, endurance, ROM and flexibility with 0 other patients with activities as follows:     Patient received therapeutic exercises to develop strength, endurance, ROM and flexibility for 16 minutes including:     Bike x 10 min  Standing gastroc stretch using 1/2 foam roll 3/30 sec L/R each  Standing soleus stretch using 1/2 foam roll 3/30 sec L/R each    MT: STM to bilateral heels, PF using theraroller x 15 min    Pt received ultrasound at 100% duty cycle applied to L and R heels/PF at intensity of 1.0 W/cm2 and frequency of 1 MHz for duration of 8 min each    Assessment:     Tolerated treatment with no increased s/s. Pain level unchanged but pt with improved R heel strike upon end of session.    Pt will continue to benefit from skilled PT intervention. Medical Necessity is demonstrated by:  Pain limits function of effected part for some activities, Unable to participate fully in daily activities and Weakness.    Patient is making fair progress towards established goals.    Plan:  Continue with established Plan of Care towards PT goals.

## 2018-07-19 ENCOUNTER — CLINICAL SUPPORT (OUTPATIENT)
Dept: REHABILITATION | Facility: HOSPITAL | Age: 53
End: 2018-07-19
Attending: PODIATRIST
Payer: COMMERCIAL

## 2018-07-19 DIAGNOSIS — R26.9 GAIT ABNORMALITY: ICD-10-CM

## 2018-07-19 PROCEDURE — 97110 THERAPEUTIC EXERCISES: CPT | Mod: PN

## 2018-07-19 PROCEDURE — 97140 MANUAL THERAPY 1/> REGIONS: CPT | Mod: PN | Performed by: PHYSICAL THERAPIST

## 2018-07-19 NOTE — PROGRESS NOTES
Name: Devan Celeste  Clinic Number: [id number]  Date of Treatment: 07/19/2018   Diagnosis: No diagnosis found.    Time in: 1700  Time Out: 1800  Total Treatment Time: 60  Group Time: NA      Subjective:    Devan reports no real change in pain levels.  Patient reports their pain to be 3/10 on a 0-10 scale with 0 being no pain and 10 being the worst pain imaginable.    Objective    Devan received therapeutic exercises to develop strength, endurance and flexibility for 60 minutes including:     X 10 min ex bike (L2)  3 x 30 sec shyla. Plantar flex stretch  3 x 30 sec standing gastroc stretch  2 x 20 shyla. towel crunches  X 20 AirEx HR/TR  X 50 ft AirEx heel-toe  X 1 min stand on BOSU  X 20 up/down 6-inch step = forwards/sideways  X 20 shuttle calf raises (62#)    Assessment:     Mr. Celeste was able to rene. tx session w/out increase in symptoms.    Pt will continue to benefit from skilled PT intervention. Medical Necessity is demonstrated by:  Pain limits function of effected part for some activities, Unable to participate fully in daily activities and Weakness.    Patient is making fair progress towards established goals.    New/Revised goals: NA      Plan:  Continue with established Plan of Care towards PT goals.

## 2018-07-20 NOTE — PROGRESS NOTES
Name: Devan Celeste  Clinic Number: [id number]  Date of Treatment: 07/19/2018   Diagnosis:   Encounter Diagnosis   Name Primary?    Gait abnormality        Time in: 1745  Time Out: 1700  Total Treatment Time: 15  Group Time: 0      Subjective:    See subjective of Richard Tiwari, PT    Objective    Patient received individual therapy to decrease pain and improve ROM/flexibility with 0 patients with activities as follows:     Dry needling was performed to the patient's abductor hallucis with 50 mm needles for 15 minutes. Written consent was obtained prior to the procedure and decreased pain was the result    Assessment:     Patient tolerated dry needling well.    Plan:  Continue with established Plan of Care towards PT goals.

## 2018-07-23 ENCOUNTER — OFFICE VISIT (OUTPATIENT)
Dept: PODIATRY | Facility: CLINIC | Age: 53
End: 2018-07-23
Payer: COMMERCIAL

## 2018-07-23 ENCOUNTER — CLINICAL SUPPORT (OUTPATIENT)
Dept: REHABILITATION | Facility: HOSPITAL | Age: 53
End: 2018-07-23
Attending: PODIATRIST
Payer: COMMERCIAL

## 2018-07-23 VITALS
BODY MASS INDEX: 28.11 KG/M2 | HEIGHT: 71 IN | SYSTOLIC BLOOD PRESSURE: 112 MMHG | WEIGHT: 200.81 LBS | HEART RATE: 62 BPM | DIASTOLIC BLOOD PRESSURE: 76 MMHG

## 2018-07-23 DIAGNOSIS — R26.9 GAIT ABNORMALITY: ICD-10-CM

## 2018-07-23 DIAGNOSIS — M79.671 FOOT PAIN, RIGHT: ICD-10-CM

## 2018-07-23 DIAGNOSIS — M72.2 PLANTAR FASCIITIS: Primary | ICD-10-CM

## 2018-07-23 DIAGNOSIS — Q66.70 PES CAVUS: ICD-10-CM

## 2018-07-23 DIAGNOSIS — M21.6X1 ACQUIRED EQUINUS DEFORMITY OF BOTH FEET: ICD-10-CM

## 2018-07-23 DIAGNOSIS — M21.6X2 ACQUIRED EQUINUS DEFORMITY OF BOTH FEET: ICD-10-CM

## 2018-07-23 PROCEDURE — 99212 OFFICE O/P EST SF 10 MIN: CPT | Mod: S$GLB,,, | Performed by: PODIATRIST

## 2018-07-23 PROCEDURE — 99999 PR PBB SHADOW E&M-EST. PATIENT-LVL III: CPT | Mod: PBBFAC,,, | Performed by: PODIATRIST

## 2018-07-23 PROCEDURE — 3008F BODY MASS INDEX DOCD: CPT | Mod: CPTII,S$GLB,, | Performed by: PODIATRIST

## 2018-07-23 PROCEDURE — 97035 APP MDLTY 1+ULTRASOUND EA 15: CPT | Mod: PN

## 2018-07-23 PROCEDURE — 97110 THERAPEUTIC EXERCISES: CPT | Mod: PN

## 2018-07-23 NOTE — PROGRESS NOTES
"Name: Devan Celeste  Clinic Number: [id number]  Date of Treatment: 07/23/2018   Diagnosis:   Encounter Diagnosis   Name Primary?    Gait abnormality        Time in: 1655  Time Out: 1730  Total Treatment Time: 35  Group Time: 0      Subjective:    Devan reports pain improved some with US.  Patient reports their pain to be 3-4/10 on a 0-10 scale with 0 being no pain and 10 being the worst pain imaginable.    Objective    Devan received therapeutic exercises to develop strength, endurance and flexibility for 19 minutes including:     Bike Lv3 10'  Plantar fascia stretch 3x30" sit with towel R/L    The patient received the following direct contact modalities after being cleared for contraindications: Ultrasound:  Devan received ultrasound to manage pain and inflammation at 100 % duty cycle applied to the R and L heels at an intensity of 1.0 W/cm2  for a duration of 8 minutes each. Patient tolerated treatment well without adverse effects. Therapist was in attendance throughout intervention.    Pt demo good understanding of the education provided. Devan demonstrated good return demonstration of activities.     Assessment:     Pt will continue to benefit from skilled PT intervention. Medical Necessity is demonstrated by:  Pain limits function of effected part for some activities, Unable to participate fully in daily activities and Requires skilled supervision to complete and progress HEP.    Plan:    Continue with established Plan of Care towards PT goals.   "

## 2018-07-23 NOTE — PROGRESS NOTES
Subjective:      Patient ID: Devan Celeste is a 52 y.o. male.    Chief Complaint: Foot Pain (bilateral aching pain  )    Sharp deep pain bottom right heel.  Gradual onset, worsening over past several weeks, aggravated by increased weight bearing, shoe gear, pressure. Inserts, athletic shoes, intermittent stretches and ice minimal help.  Injection 1/23/2018 and 4/30/18 helped for about a week.  OTC pain med not helping.  Medrol helped short term.  Awaiting custom orthotic dispense.  Did not attend PT.  Denies trauma, surgery right foot.  xrays negative acute injury, positive inferior heel spur.    Review of Systems   Constitution: Negative for chills, diaphoresis, fever, malaise/fatigue and night sweats.   Cardiovascular: Negative for claudication, cyanosis, leg swelling and syncope.   Skin: Negative for color change, dry skin, nail changes, rash, suspicious lesions and unusual hair distribution.   Musculoskeletal: Negative for falls, joint pain, joint swelling, muscle cramps, muscle weakness and stiffness.   Gastrointestinal: Negative for constipation, diarrhea, nausea and vomiting.   Neurological: Negative for brief paralysis, disturbances in coordination, focal weakness, numbness, paresthesias, sensory change and tremors.           Objective:      Physical Exam   Constitutional: He is oriented to person, place, and time. He appears well-developed and well-nourished. He is cooperative. No distress.   Cardiovascular:   Pulses:       Popliteal pulses are 2+ on the right side, and 2+ on the left side.        Dorsalis pedis pulses are 2+ on the right side, and 2+ on the left side.        Posterior tibial pulses are 2+ on the right side, and 2+ on the left side.   Capillary refill 3 seconds all toes/distal feet, all toes/both feet warm to touch.      Negative lymphadenopathy bilateral popliteal fossa and tarsal tunnel.      Negavie lower extremity edema bilateral.     Musculoskeletal:        Right ankle: He exhibits  normal range of motion, no swelling, no ecchymosis, no deformity, no laceration and normal pulse. Achilles tendon normal. Achilles tendon exhibits no pain, no defect and normal Jean's test results.   Sharp deep pain to palpation inferior heel right and left at medial calcaneal tubercle without ecchymosis, erythema, edema, or cardinal signs infection, and no signs of trauma.    Ankle dorsiflexion decreased at <10 degrees bilateral with moderate increase with knee flexion bilateral.    Otherwise, Normal angle, base, station of gait. All ten toes without clubbing, cyanosis, or signs of ischemia.  No pain to palpation bilateral lower extremities.  Range of motion, stability, muscle strength, and muscle tone normal bilateral feet and legs.     Lymphadenopathy: No inguinal adenopathy noted on the right or left side.   Negative lymphadenopathy bilateral popliteal fossa and tarsal tunnel.    Negative lymphangitic streaking bilateral feet/ankles/legs.   Neurological: He is alert and oriented to person, place, and time. He has normal strength. He displays no atrophy and no tremor. No sensory deficit. He exhibits normal muscle tone. Gait normal.   Reflex Scores:       Patellar reflexes are 2+ on the right side and 2+ on the left side.       Achilles reflexes are 2+ on the right side and 2+ on the left side.  Negative tinel sign to percussion sural, superficial peroneal, deep peroneal, saphenous, and posterior tibial nerves right and left ankles and feet.     Skin: Skin is warm, dry and intact. Capillary refill takes 2 to 3 seconds. No abrasion, no bruising, no burn, no ecchymosis, no laceration, no lesion and no rash noted. He is not diaphoretic. No cyanosis or erythema. No pallor. Nails show no clubbing.     Skin is normal age and health appropriate color, turgor, texture, and temperature bilateral lower extremities without ulceration, hyperpigmentation, discoloration, masses nodules or cords palpated.  No ecchymosis,  erythema, edema, or cardinal signs of infection bilateral lower extremities.     Psychiatric: He has a normal mood and affect.             Assessment:       Encounter Diagnoses   Name Primary?    Plantar fasciitis Yes    Foot pain, right     Pes cavus     Acquired equinus deformity of both feet          Plan:       Devan was seen today for foot pain.    Diagnoses and all orders for this visit:    Plantar fasciitis    Foot pain, right    Pes cavus    Acquired equinus deformity of both feet      I counseled the patient on his conditions, their implications and medical management.        Patient will stretch the tendo achilles complex three times daily as demonstrated in the office.  Literature was dispensed illustrating proper stretching technique.    Continue night splint, otc inserts,athletic shoes, stretches, prn nsaid    Counseled on conservative treatments including custom orthotics, shoe and activity change, physical therapy, antiinflammatory, and pain medication, and sometimes injections for symptomatic relief.    Counseled on surgical correction,- endoscopic plantar fascia release - risks and benefits, including, but not limited to recurrence, infection, pain, scar, poor cosmesis, loss of function, arthritis, healing in poor position, new or different ulceration or pre ulcerative callus, nerve pain, nerve damage, numbness, syndromes (RSD), need for future surgical procedures, and or long term use of orthotics, blood clots of leg, lung, heart, brain, death.    Consider carefully, appoint with pcp for surgical clearance, follow here prn as symptoms dictate for consent, post op Rx, and scheduling.    Declines fx boot and casting, injection.    Recommend modification of orthotic devices.        Follow-up in about 6 weeks (around 9/3/2018).

## 2018-07-27 ENCOUNTER — TELEPHONE (OUTPATIENT)
Dept: REHABILITATION | Facility: HOSPITAL | Age: 53
End: 2018-07-27

## 2018-07-30 ENCOUNTER — CLINICAL SUPPORT (OUTPATIENT)
Dept: REHABILITATION | Facility: HOSPITAL | Age: 53
End: 2018-07-30
Attending: PODIATRIST
Payer: COMMERCIAL

## 2018-07-30 DIAGNOSIS — M79.671 FOOT PAIN, BILATERAL: ICD-10-CM

## 2018-07-30 DIAGNOSIS — M72.2 PLANTAR FASCIITIS: Primary | ICD-10-CM

## 2018-07-30 DIAGNOSIS — M79.672 FOOT PAIN, BILATERAL: ICD-10-CM

## 2018-07-30 PROCEDURE — 97035 APP MDLTY 1+ULTRASOUND EA 15: CPT | Mod: PN

## 2018-07-30 PROCEDURE — 97110 THERAPEUTIC EXERCISES: CPT | Mod: PN

## 2018-07-30 NOTE — PROGRESS NOTES
Name: Devan Celeste   Clinic Number: [id number]   Age: 52 y.o.   Diagnosis:   Encounter Diagnoses   Name Primary?    Plantar fasciitis Yes    Foot pain, bilateral       Physician: James Barnes DPM   Original Orders : PT eval and treat  Initial visit: 06/25/18  Date of Last visit: 07/30/18  Date of Discharge Note:  07/30/18  Total Visits Received: 8  Missed Visits: 4    Subjective: reports minimal relief from PT; c/o persistent 3-4/10 pain in shyla. feet    Objective:  Treatment :    Included:Therapeutic exercise, AROM, Stretching and HEP        Treatment today:      X 10 min bike (L3)  X 8 min u/s shyla. heels (1.0 w/cm2)  Provided pt. W/ HEP - understood importance of cont. to perform on her own to maintain therapeutic gains    Time In: 1700  Time Out: 1745     Assessment:  LEFS = 66/80; independent w/ HEP    Goals Achieved: 1 of 2 stg's met; 2 of 3 stg's met    Discharge reason : Patient has maximized benefit from PT at this time    Discharge plan :Continue HEP and Pt to follow-up with MD as planned    Plan:  This patient is discharged from [facility] Services.

## 2018-08-28 ENCOUNTER — OFFICE VISIT (OUTPATIENT)
Dept: PODIATRY | Facility: CLINIC | Age: 53
End: 2018-08-28
Payer: COMMERCIAL

## 2018-08-28 VITALS — WEIGHT: 200.63 LBS | HEIGHT: 71 IN | BODY MASS INDEX: 28.09 KG/M2

## 2018-08-28 DIAGNOSIS — M79.671 FOOT PAIN, RIGHT: ICD-10-CM

## 2018-08-28 DIAGNOSIS — M72.2 PLANTAR FASCIITIS: Primary | ICD-10-CM

## 2018-08-28 PROCEDURE — 99213 OFFICE O/P EST LOW 20 MIN: CPT | Mod: 25,S$GLB,, | Performed by: PODIATRIST

## 2018-08-28 PROCEDURE — 3008F BODY MASS INDEX DOCD: CPT | Mod: CPTII,S$GLB,, | Performed by: PODIATRIST

## 2018-08-28 PROCEDURE — 99999 PR PBB SHADOW E&M-EST. PATIENT-LVL III: CPT | Mod: PBBFAC,,, | Performed by: PODIATRIST

## 2018-08-28 PROCEDURE — 20550 NJX 1 TENDON SHEATH/LIGAMENT: CPT | Mod: 51,LT,S$GLB, | Performed by: PODIATRIST

## 2018-08-28 RX ORDER — DEXAMETHASONE SODIUM PHOSPHATE 4 MG/ML
4 INJECTION, SOLUTION INTRA-ARTICULAR; INTRALESIONAL; INTRAMUSCULAR; INTRAVENOUS; SOFT TISSUE
Status: COMPLETED | OUTPATIENT
Start: 2018-08-28 | End: 2018-09-12

## 2018-08-28 RX ORDER — TRIAMCINOLONE ACETONIDE 40 MG/ML
40 INJECTION, SUSPENSION INTRA-ARTICULAR; INTRAMUSCULAR
Status: COMPLETED | OUTPATIENT
Start: 2018-08-28 | End: 2018-09-12

## 2018-08-28 NOTE — PROGRESS NOTES
Subjective:      Patient ID: Devan Celeste is a 52 y.o. male.    Chief Complaint: Foot Pain    Sharp deep pain bottom right heel.  Gradual onset, worsening over past several weeks, aggravated by increased weight bearing, shoe gear, pressure. Inserts, athletic shoes, intermittent stretches and ice minimal help.  Injection 1/23/2018 and 4/30/18 helped for about a week.  OTC pain med not helping.  Medrol helped short term.    Did not attend PT - therapist cancelled.  Denies trauma, surgery right foot.  xrays negative acute injury, positive inferior heel spur.  Orthotic modification no real help.    Review of Systems   Constitution: Negative for chills, diaphoresis, fever, malaise/fatigue and night sweats.   Cardiovascular: Negative for claudication, cyanosis, leg swelling and syncope.   Skin: Negative for color change, dry skin, nail changes, rash, suspicious lesions and unusual hair distribution.   Musculoskeletal: Negative for falls, joint pain, joint swelling, muscle cramps, muscle weakness and stiffness.   Gastrointestinal: Negative for constipation, diarrhea, nausea and vomiting.   Neurological: Negative for brief paralysis, disturbances in coordination, focal weakness, numbness, paresthesias, sensory change and tremors.           Objective:      Physical Exam   Constitutional: He is oriented to person, place, and time. He appears well-developed and well-nourished. He is cooperative. No distress.   Cardiovascular:   Pulses:       Popliteal pulses are 2+ on the right side, and 2+ on the left side.        Dorsalis pedis pulses are 2+ on the right side, and 2+ on the left side.        Posterior tibial pulses are 2+ on the right side, and 2+ on the left side.   Capillary refill 3 seconds all toes/distal feet, all toes/both feet warm to touch.      Negative lymphadenopathy bilateral popliteal fossa and tarsal tunnel.      Negavie lower extremity edema bilateral.     Musculoskeletal:        Right ankle: He exhibits  normal range of motion, no swelling, no ecchymosis, no deformity, no laceration and normal pulse. Achilles tendon normal. Achilles tendon exhibits no pain, no defect and normal Jean's test results.   Sharp deep pain to palpation inferior heel right and left at medial calcaneal tubercle without ecchymosis, erythema, edema, or cardinal signs infection, and no signs of trauma.    New pain to palpation inferior lateral right heel without new visual or functional finding.    Ankle dorsiflexion decreased at <10 degrees bilateral with moderate increase with knee flexion bilateral.    Otherwise, Normal angle, base, station of gait. All ten toes without clubbing, cyanosis, or signs of ischemia.  No pain to palpation bilateral lower extremities.  Range of motion, stability, muscle strength, and muscle tone normal bilateral feet and legs.     Lymphadenopathy: No inguinal adenopathy noted on the right or left side.   Negative lymphadenopathy bilateral popliteal fossa and tarsal tunnel.    Negative lymphangitic streaking bilateral feet/ankles/legs.   Neurological: He is alert and oriented to person, place, and time. He has normal strength. He displays no atrophy and no tremor. No sensory deficit. He exhibits normal muscle tone. Gait normal.   Reflex Scores:       Patellar reflexes are 2+ on the right side and 2+ on the left side.       Achilles reflexes are 2+ on the right side and 2+ on the left side.  Negative tinel sign to percussion sural, superficial peroneal, deep peroneal, saphenous, and posterior tibial nerves right and left ankles and feet.     Skin: Skin is warm, dry and intact. Capillary refill takes 2 to 3 seconds. No abrasion, no bruising, no burn, no ecchymosis, no laceration, no lesion and no rash noted. He is not diaphoretic. No cyanosis or erythema. No pallor. Nails show no clubbing.     Skin is normal age and health appropriate color, turgor, texture, and temperature bilateral lower extremities without  ulceration, hyperpigmentation, discoloration, masses nodules or cords palpated.  No ecchymosis, erythema, edema, or cardinal signs of infection bilateral lower extremities.     Psychiatric: He has a normal mood and affect.             Assessment:       Encounter Diagnoses   Name Primary?    Plantar fasciitis Yes    Foot pain, right          Plan:       Devan was seen today for foot pain.    Diagnoses and all orders for this visit:    Plantar fasciitis  -     Ambulatory consult to Physical Therapy    Foot pain, right  -     Ambulatory consult to Physical Therapy    Other orders  -     dexamethasone injection 4 mg; Inject 1 mL (4 mg total) into the vein one time.  -     triamcinolone acetonide injection 40 mg; 1 mL (40 mg total) by Other route one time.      I counseled the patient on his conditions, their implications and medical management.        Patient will stretch the tendo achilles complex three times daily as demonstrated in the office.  Literature was dispensed illustrating proper stretching technique.    Continue night splint, otc inserts,athletic shoes, stretches, prn nsaid    Counseled the patient on the potential complications of local injection of corticosteroid including, but not limited to:  Discoloration of skin, erosion of soft tissue, and increased likelihood of rupture of a soft tissue structure (ie. Plantar fascia, muscle, tendon, ligament, or capsule in the area of injection).  Patient indicates understanding of my explanation, that any questions have been answered to patient satisfaction, and patient gives verbal consent for injection of affected area.    After sterile skin prep, steroid injection #3 right and #2 left was performed with patient verbal consent and timeout procedure with patient identifiers, site markings, and procedures in agreement to all present, at right and left plantar fascia using 20 mg of Kenalog, 4mg dexamethazone sodium phosphate, and 1mL 1% Lidocaine plain. This was  well tolerated.      Counseled on conservative treatments including custom orthotics, shoe and activity change, physical therapy, antiinflammatory, and pain medication, and sometimes injections for symptomatic relief.    Counseled on surgical correction,- endoscopic plantar fascia release - risks and benefits, including, but not limited to recurrence, infection, pain, scar, poor cosmesis, loss of function, arthritis, healing in poor position, new or different ulceration or pre ulcerative callus, nerve pain, nerve damage, numbness, syndromes (RSD), need for future surgical procedures, and or long term use of orthotics, blood clots of leg, lung, heart, brain, death.    Consider carefully, appoint with pcp for surgical clearance, follow here prn as symptoms dictate for consent, post op Rx, and scheduling.    Declines fx boot and casting, MRI, second opinion.            Follow-up if symptoms worsen or fail to improve.

## 2018-09-12 RX ADMIN — DEXAMETHASONE SODIUM PHOSPHATE 4 MG: 4 INJECTION, SOLUTION INTRA-ARTICULAR; INTRALESIONAL; INTRAMUSCULAR; INTRAVENOUS; SOFT TISSUE at 06:09

## 2018-09-12 RX ADMIN — TRIAMCINOLONE ACETONIDE 40 MG: 40 INJECTION, SUSPENSION INTRA-ARTICULAR; INTRAMUSCULAR at 06:09

## 2019-02-21 ENCOUNTER — OFFICE VISIT (OUTPATIENT)
Dept: PODIATRY | Facility: CLINIC | Age: 54
End: 2019-02-21
Payer: COMMERCIAL

## 2019-02-21 VITALS
WEIGHT: 202 LBS | SYSTOLIC BLOOD PRESSURE: 149 MMHG | HEART RATE: 60 BPM | HEIGHT: 71 IN | BODY MASS INDEX: 28.28 KG/M2 | TEMPERATURE: 98 F | DIASTOLIC BLOOD PRESSURE: 88 MMHG

## 2019-02-21 DIAGNOSIS — M79.672 BILATERAL FOOT PAIN: Primary | ICD-10-CM

## 2019-02-21 DIAGNOSIS — M20.12 VALGUS DEFORMITY OF BOTH GREAT TOES: ICD-10-CM

## 2019-02-21 DIAGNOSIS — M79.671 BILATERAL FOOT PAIN: Primary | ICD-10-CM

## 2019-02-21 DIAGNOSIS — M20.11 VALGUS DEFORMITY OF BOTH GREAT TOES: ICD-10-CM

## 2019-02-21 DIAGNOSIS — M72.2 PLANTAR FASCIITIS: ICD-10-CM

## 2019-02-21 PROCEDURE — 3008F BODY MASS INDEX DOCD: CPT | Mod: ,,, | Performed by: PODIATRIST

## 2019-02-21 PROCEDURE — 73630 X-RAY EXAM OF FOOT: CPT | Mod: LT,,, | Performed by: PODIATRIST

## 2019-02-21 PROCEDURE — 73630 PR  X-RAY FOOT 3+ VW: ICD-10-PCS | Mod: LT,,, | Performed by: PODIATRIST

## 2019-02-21 PROCEDURE — 99203 PR OFFICE/OUTPT VISIT, NEW, LEVL III, 30-44 MIN: ICD-10-PCS | Mod: 25,,, | Performed by: PODIATRIST

## 2019-02-21 PROCEDURE — 99203 OFFICE O/P NEW LOW 30 MIN: CPT | Mod: 25,,, | Performed by: PODIATRIST

## 2019-02-21 PROCEDURE — 3008F PR BODY MASS INDEX (BMI) DOCUMENTED: ICD-10-PCS | Mod: ,,, | Performed by: PODIATRIST

## 2019-02-21 NOTE — PROGRESS NOTES
[address] [facility] [address]  [place] [address]  Phone: [phone]   [phone]    Patient's PCP:Shen Mclean MD  Referring Provider: No ref. provider found    Subjective:      Chief Complaint:: No chief complaint on file.    HPI  Devan Celeste is a 53 y.o. male who presents with a complaint of  Bilateral heel pain (plantar surface) lasting for about a year. Onset of the symptoms is unknown.  Current symptoms include pain and swelling.  Aggravating factors are bearing weight. Symptoms have gotten worse since this began.Treatment to date have included two doctors , 5 injections, custom inserts, OTC inserts, several pairs of shoes, stretching, icing, 5 weeks of physical therapy.  Patients rates pain 10/10 on pain scale.        Vitals:    02/21/19 1540   BP: (!) 149/88   Pulse: 60   Temp: 98.2 °F (36.8 °C)     Shoe Size: 9    Past Surgical History:   Procedure Laterality Date    GALLBLADDER SURGERY      OTHER SURGICAL HISTORY       History reviewed. No pertinent past medical history.  History reviewed. No pertinent family history.     Social History:   Marital Status:   Alcohol History:  has no alcohol history on file.  Tobacco History:  reports that  has never smoked. He does not have any smokeless tobacco history on file.  Drug History:  has no drug history on file.    Review of patient's allergies indicates:  No Known Allergies    Current Outpatient Medications   Medication Sig Dispense Refill    azaTHIOprine (IMURAN) 50 mg Tab       bacitracin 500 unit/gram ointment Apply topically 2 (two) times daily.      esomeprazole (NEXIUM) 40 MG capsule Nexium 40 mg capsule,delayed release   TAKE 1 CAPSULE BY MOUTH DAILY      mesalamine (LIALDA) 1.2 gram TbEC        No current facility-administered medications for this visit.        Review of Systems   Constitutional: Negative for chills, fatigue, fever and unexpected weight change.   HENT: Negative for hearing loss and trouble swallowing.     Eyes: Negative for photophobia and visual disturbance.   Respiratory: Negative for cough, shortness of breath and wheezing.    Cardiovascular: Negative for chest pain, palpitations and leg swelling.   Gastrointestinal: Negative for abdominal pain and nausea.   Genitourinary: Negative for dysuria and frequency.   Musculoskeletal: Positive for arthralgias and back pain. Negative for joint swelling.   Skin: Negative for rash.   Neurological: Negative for tremors, seizures, weakness, numbness and headaches.   Hematological: Does not bruise/bleed easily.         Objective:        Physical Exam:   Foot Exam    General  General Appearance: appears stated age and healthy   Orientation: alert and oriented to person, place, and time   Affect: appropriate   Gait: unimpaired       Right Foot/Ankle     Neurovascular  Dorsalis pedis: 2+  Posterior tibial: 2+  Saphenous nerve sensation: normal  Tibial nerve sensation: normal  Superficial peroneal nerve sensation: normal  Deep peroneal nerve sensation: normal  Sural nerve sensation: normal    Comments  Pain on palpation of the infeior medial heel. No pain present  with side to side compression of the calcaneus. Negative tinnel's sign  at the tarsal tunnel. Negative Wilson's nerve pain. Negative Calcaneal nerve pain. No soft tissue masses. Pain present with dorsiflexion of the ankle. No edema, erythema, or ecchymosis noted.     Left Foot/Ankle      Neurovascular  Dorsalis pedis: 2+  Posterior tibial: 2+  Saphenous nerve sensation: normal  Tibial nerve sensation: normal  Superficial peroneal nerve sensation: normal  Deep peroneal nerve sensation: normal  Sural nerve sensation: normal    Comments  Pain on palpation of the infeior medial heel. No pain present  with side to side compression of the calcaneus. Negative tinnel's sign  at the tarsal tunnel. Negative Wilson's nerve pain. Negative Calcaneal nerve pain. No soft tissue masses. Pain present with dorsiflexion of the ankle. No  edema, erythema, or ecchymosis noted.     Physical Exam   Cardiovascular:   Pulses:       Dorsalis pedis pulses are 2+ on the right side, and 2+ on the left side.        Posterior tibial pulses are 2+ on the right side, and 2+ on the left side.       Imaging:   X-Ray Foot Complete Left  AP, Lateral, Lateral oblique weight bearing left foot:  No fractures, no bone tumors, no soft tissue masses, pes cavus foot structure.           Assessment:       1. Bilateral foot pain    2. Plantar fasciitis    3. Valgus deformity of both great toes      Plan:   Bilateral foot pain  -     X-Ray Foot Complete Left  -     X-Ray Foot Complete Right    Plantar fasciitis    Valgus deformity of both great toes    Plantar Fasciitis Level I Treatment:   Anti-inflammatory  No bare feet  Over the counter insert  Restrict activity level   Use running shoes at full time  No impact exercise and stretch gastrocnemius muscle  No Follow-up on file.    Procedures - None    Counseling:     I provided patient education verbally regarding:   Patient diagnosis, treatment options, as well as alternatives, risks, and benefits.     This note was created using Dragon voice recognition software that occasionally misinterpreted phrases or words.

## 2019-02-22 PROBLEM — M72.2 PLANTAR FASCIITIS: Status: ACTIVE | Noted: 2019-02-22

## 2019-05-03 ENCOUNTER — OFFICE VISIT (OUTPATIENT)
Dept: PODIATRY | Facility: CLINIC | Age: 54
End: 2019-05-03
Payer: COMMERCIAL

## 2019-05-03 VITALS
HEART RATE: 57 BPM | DIASTOLIC BLOOD PRESSURE: 81 MMHG | HEIGHT: 71 IN | SYSTOLIC BLOOD PRESSURE: 125 MMHG | WEIGHT: 200.63 LBS | BODY MASS INDEX: 28.09 KG/M2

## 2019-05-03 DIAGNOSIS — M79.672 FOOT PAIN, BILATERAL: ICD-10-CM

## 2019-05-03 DIAGNOSIS — M72.2 PLANTAR FASCIITIS: Primary | ICD-10-CM

## 2019-05-03 DIAGNOSIS — M79.671 FOOT PAIN, BILATERAL: ICD-10-CM

## 2019-05-03 DIAGNOSIS — M24.573 EQUINUS CONTRACTURE OF ANKLE: ICD-10-CM

## 2019-05-03 PROCEDURE — 20550 PR INJECT TENDON SHEATH/LIGAMENT: ICD-10-PCS | Mod: RT,S$GLB,, | Performed by: PODIATRIST

## 2019-05-03 PROCEDURE — 3008F PR BODY MASS INDEX (BMI) DOCUMENTED: ICD-10-PCS | Mod: CPTII,S$GLB,, | Performed by: PODIATRIST

## 2019-05-03 PROCEDURE — 99999 PR PBB SHADOW E&M-EST. PATIENT-LVL III: CPT | Mod: PBBFAC,,, | Performed by: PODIATRIST

## 2019-05-03 PROCEDURE — 99213 OFFICE O/P EST LOW 20 MIN: CPT | Mod: 25,S$GLB,, | Performed by: PODIATRIST

## 2019-05-03 PROCEDURE — 99213 PR OFFICE/OUTPT VISIT, EST, LEVL III, 20-29 MIN: ICD-10-PCS | Mod: 25,S$GLB,, | Performed by: PODIATRIST

## 2019-05-03 PROCEDURE — 20550 NJX 1 TENDON SHEATH/LIGAMENT: CPT | Mod: RT,S$GLB,, | Performed by: PODIATRIST

## 2019-05-03 PROCEDURE — 3008F BODY MASS INDEX DOCD: CPT | Mod: CPTII,S$GLB,, | Performed by: PODIATRIST

## 2019-05-03 PROCEDURE — 99999 PR PBB SHADOW E&M-EST. PATIENT-LVL III: ICD-10-PCS | Mod: PBBFAC,,, | Performed by: PODIATRIST

## 2019-05-03 RX ORDER — DEXAMETHASONE SODIUM PHOSPHATE 4 MG/ML
4 INJECTION, SOLUTION INTRA-ARTICULAR; INTRALESIONAL; INTRAMUSCULAR; INTRAVENOUS; SOFT TISSUE
Status: COMPLETED | OUTPATIENT
Start: 2019-05-03 | End: 2019-05-03

## 2019-05-03 RX ORDER — TRIAMCINOLONE ACETONIDE 40 MG/ML
40 INJECTION, SUSPENSION INTRA-ARTICULAR; INTRAMUSCULAR
Status: COMPLETED | OUTPATIENT
Start: 2019-05-03 | End: 2019-05-03

## 2019-05-03 RX ORDER — DICLOFENAC SODIUM 10 MG/G
2 GEL TOPICAL 4 TIMES DAILY
Qty: 100 G | Refills: 2 | Status: SHIPPED | OUTPATIENT
Start: 2019-05-03

## 2019-05-03 RX ADMIN — TRIAMCINOLONE ACETONIDE 40 MG: 40 INJECTION, SUSPENSION INTRA-ARTICULAR; INTRAMUSCULAR at 11:05

## 2019-05-03 RX ADMIN — DEXAMETHASONE SODIUM PHOSPHATE 4 MG: 4 INJECTION, SOLUTION INTRA-ARTICULAR; INTRALESIONAL; INTRAMUSCULAR; INTRAVENOUS; SOFT TISSUE at 11:05

## 2019-05-03 NOTE — PROGRESS NOTES
Subjective:      Patient ID: Devan Celeste is a 53 y.o. male.    Chief Complaint: Foot Pain ( Bilateral)    Sharp deep pain bottom left more than right heel. Gradual onset, worsening over past several weeks, aggravated by increased weight bearing, shoe gear, pressure.  Stretches, rest, inserts, orthotics not currentl y helping, but have in the past.  Denies trauma, surgery.     Review of Systems   Constitution: Negative for chills, diaphoresis, fever, malaise/fatigue and night sweats.   Cardiovascular: Negative for claudication, cyanosis, leg swelling and syncope.   Skin: Negative for color change, dry skin, nail changes, rash, suspicious lesions and unusual hair distribution.   Musculoskeletal: Negative for falls, joint pain, joint swelling, muscle cramps, muscle weakness and stiffness.   Gastrointestinal: Negative for constipation, diarrhea, nausea and vomiting.   Neurological: Negative for brief paralysis, disturbances in coordination, focal weakness, numbness, paresthesias, sensory change and tremors.           Objective:      Physical Exam   Constitutional: He is oriented to person, place, and time. He appears well-developed and well-nourished. He is cooperative. No distress.   Cardiovascular:   Pulses:       Popliteal pulses are 2+ on the right side, and 2+ on the left side.        Dorsalis pedis pulses are 2+ on the right side, and 2+ on the left side.        Posterior tibial pulses are 2+ on the right side, and 2+ on the left side.   Capillary refill 3 seconds all toes/distal feet, all toes/both feet warm to touch.      Negative lymphadenopathy bilateral popliteal fossa and tarsal tunnel.      Negavie lower extremity edema bilateral.     Musculoskeletal:        Right ankle: He exhibits normal range of motion, no swelling, no ecchymosis, no deformity, no laceration and normal pulse. Achilles tendon normal. Achilles tendon exhibits no pain, no defect and normal Jean's test results.   Sharp deep pain to  palpation inferior heel right and left at medial calcaneal tubercle without ecchymosis, erythema, edema, or cardinal signs infection, and no signs of trauma.    Ankle dorsiflexion decreased at <10 degrees bilateral with moderate increase with knee flexion bilateral.    Otherwise, Normal angle, base, station of gait. All ten toes without clubbing, cyanosis, or signs of ischemia.  No pain to palpation bilateral lower extremities.  Range of motion, stability, muscle strength, and muscle tone normal bilateral feet and legs.    Lymphadenopathy: No inguinal adenopathy noted on the right or left side.   Negative lymphadenopathy bilateral popliteal fossa and tarsal tunnel.    Negative lymphangitic streaking bilateral feet/ankles/legs.   Neurological: He is alert and oriented to person, place, and time. He has normal strength. He displays no atrophy and no tremor. No sensory deficit. He exhibits normal muscle tone. Gait normal.   Reflex Scores:       Patellar reflexes are 2+ on the right side and 2+ on the left side.       Achilles reflexes are 2+ on the right side and 2+ on the left side.  Negative tinel sign to percussion sural, superficial peroneal, deep peroneal, saphenous, and posterior tibial nerves right and left ankles and feet.     Skin: Skin is warm, dry and intact. Capillary refill takes 2 to 3 seconds. No abrasion, no bruising, no burn, no ecchymosis, no laceration, no lesion and no rash noted. He is not diaphoretic. No cyanosis or erythema. No pallor. Nails show no clubbing.     Skin is normal age and health appropriate color, turgor, texture, and temperature bilateral lower extremities without ulceration, hyperpigmentation, discoloration, masses nodules or cords palpated.  No ecchymosis, erythema, edema, or cardinal signs of infection bilateral lower extremities.     Psychiatric: He has a normal mood and affect.             Assessment:       Encounter Diagnoses   Name Primary?    Plantar fasciitis Yes    Foot  pain, bilateral     Equinus contracture of ankle          Plan:       Devan was seen today for foot pain.    Diagnoses and all orders for this visit:    Plantar fasciitis  -     Ambulatory consult to Physical Therapy    Foot pain, bilateral  -     Ambulatory consult to Physical Therapy    Equinus contracture of ankle  -     Ambulatory consult to Physical Therapy    Other orders  -     diclofenac sodium (VOLTAREN) 1 % Gel; Apply 2 g topically 4 (four) times daily.  -     dexamethasone injection 4 mg  -     triamcinolone acetonide injection 40 mg      I counseled the patient on his conditions, their implications and medical management.        Patient will stretch the tendo achilles complex three times daily as demonstrated in the office.  Literature was dispensed illustrating proper stretching technique.    Continue stretches, custom orthotics, athletic shoes.    The patient was advised that NSAID-type medications have two very important potential side effects: gastrointestinal irritation including hemorrhage and renal injuries. He was asked to take the medication with food and to stop if he experiences any GI upset. I asked him to call for vomiting, abdominal pain or black/bloody stools. The patient expresses understanding of these issues and questions were answered.    Discussed conservative treatment with shoes of adequate dimensions, material, and style to alleviate symptoms and delay or prevent surgical intervention.    Rx voltaren gel, PT.    Patient wants injection.    Counseled the patient on the potential complications of local injection of corticosteroid including, but not limited to:  Discoloration of skin, erosion of soft tissue, and increased likelihood of rupture of a soft tissue structure (ie. Plantar fascia, muscle, tendon, ligament, or capsule in the area of injection).  Patient indicates understanding of my explanation, that any questions have been answered to patient satisfaction, and patient gives  verbal consent for injection of affected area.    After sterile skin prep, steroid injection was performed with patient verbal consent and timeout procedure with patient identifiers, site markings, and procedures in agreement to all present, at right and left plantar fascia using total 40 mg of Kenalog, 4mg dexamethazone sodium phosphate, and 1mL 1% Lidocaine plain. This was well tolerated.            No follow-ups on file.

## 2020-09-16 ENCOUNTER — TELEPHONE (OUTPATIENT)
Dept: FAMILY MEDICINE | Facility: CLINIC | Age: 55
End: 2020-09-16

## 2020-09-16 NOTE — TELEPHONE ENCOUNTER
----- Message from Olga Cage sent at 9/16/2020  8:43 AM CDT -----  Pt was diagnosed with covid 19 on 8/14. Pt is still testing positive and wants to know what else he can do. Is there another test available or something he can take. Pt is very confused. Pt has had no symptoms in 2  1/2 weeks. Pt has had 4 tests and can not return to work until he has a negative test result. Please advise. Pt #[phone].

## 2020-09-16 NOTE — TELEPHONE ENCOUNTER
We have not seen him since 10/26/2015. Pt would be considered a new pt. We can not give out any medical advice. Pt will need to schedule a NP appt.

## 2022-05-05 ENCOUNTER — OFFICE VISIT (OUTPATIENT)
Dept: PODIATRY | Facility: CLINIC | Age: 57
End: 2022-05-05
Payer: COMMERCIAL

## 2022-05-05 ENCOUNTER — HOSPITAL ENCOUNTER (OUTPATIENT)
Dept: RADIOLOGY | Facility: CLINIC | Age: 57
Discharge: HOME | End: 2022-05-05
Attending: PODIATRIST
Payer: COMMERCIAL

## 2022-05-05 DIAGNOSIS — M72.2 PLANTAR FASCIITIS: Primary | ICD-10-CM

## 2022-05-05 DIAGNOSIS — M79.672 PAIN IN BOTH FEET: ICD-10-CM

## 2022-05-05 DIAGNOSIS — M79.671 PAIN IN BOTH FEET: ICD-10-CM

## 2022-05-05 PROCEDURE — 73630 XR FOOT COMPLETE 3 VIEW BILATERAL: ICD-10-PCS | Mod: 50,S$GLB,, | Performed by: RADIOLOGY

## 2022-05-05 PROCEDURE — 1160F PR REVIEW ALL MEDS BY PRESCRIBER/CLIN PHARMACIST DOCUMENTED: ICD-10-PCS | Mod: CPTII,S$GLB,, | Performed by: PODIATRIST

## 2022-05-05 PROCEDURE — 1160F RVW MEDS BY RX/DR IN RCRD: CPT | Mod: CPTII,S$GLB,, | Performed by: PODIATRIST

## 2022-05-05 PROCEDURE — 99214 OFFICE O/P EST MOD 30 MIN: CPT | Mod: S$GLB,,, | Performed by: PODIATRIST

## 2022-05-05 PROCEDURE — 73630 X-RAY EXAM OF FOOT: CPT | Mod: 50,S$GLB,, | Performed by: RADIOLOGY

## 2022-05-05 PROCEDURE — 99214 PR OFFICE/OUTPT VISIT, EST, LEVL IV, 30-39 MIN: ICD-10-PCS | Mod: S$GLB,,, | Performed by: PODIATRIST

## 2022-05-05 PROCEDURE — 1159F PR MEDICATION LIST DOCUMENTED IN MEDICAL RECORD: ICD-10-PCS | Mod: CPTII,S$GLB,, | Performed by: PODIATRIST

## 2022-05-05 PROCEDURE — 1159F MED LIST DOCD IN RCRD: CPT | Mod: CPTII,S$GLB,, | Performed by: PODIATRIST

## 2022-05-05 NOTE — PROGRESS NOTES
[address]  [place] [address]  Phone: [phone]   [phone]    Patient's PCP:Shen Mclean MD  Referring Provider: No ref. provider found    Subjective:      Chief Complaint:: Foot Pain (bilateral)    ISSA Celeste is a 56 y.o. male who presents today with a complaint of bilateral heel pain lasting for 3-4 years . Onset of symptoms unknown and reports no trauma.  Current symptoms include pain.  Aggravating factors are walking,weightbearing,prolong standing. Symptoms have progressed. He reports his pain has started to affect his lower back and hips.Treatment to date have included  injections, custom inserts, OTC inserts, several pairs of shoes, stretching, icing, 5 weeks of physical therapy all without relief.     Shoe Size:9     Past Surgical History:   Procedure Laterality Date    GALLBLADDER SURGERY      OTHER SURGICAL HISTORY       History reviewed. No pertinent past medical history.  History reviewed. No pertinent family history.     Social History:   Marital Status:   Alcohol History:  has no history on file for alcohol use.  Tobacco History:  reports that he has never smoked. He has never used smokeless tobacco.  Drug History:  has no history on file for drug use.    Review of patient's allergies indicates:  No Known Allergies    Current Outpatient Medications   Medication Sig Dispense Refill    azaTHIOprine (IMURAN) 50 mg Tab       bacitracin 500 unit/gram ointment Apply topically 2 (two) times daily.      diclofenac sodium (VOLTAREN) 1 % Gel Apply 2 g topically 4 (four) times daily. 100 g 2    esomeprazole (NEXIUM) 40 MG capsule Nexium 40 mg capsule,delayed release   TAKE 1 CAPSULE BY MOUTH DAILY      mesalamine (LIALDA) 1.2 gram TbEC        No current facility-administered medications for this visit.       Review of Systems   Constitutional: Negative for chills, fatigue, fever and unexpected weight change.   HENT: Negative for hearing loss and trouble  swallowing.    Eyes: Negative for photophobia and visual disturbance.   Respiratory: Negative for cough, shortness of breath and wheezing.    Cardiovascular: Negative for chest pain, palpitations and leg swelling.   Gastrointestinal: Negative for abdominal pain and nausea.   Genitourinary: Negative for dysuria and frequency.   Musculoskeletal: Positive for back pain and gait problem. Negative for arthralgias, joint swelling and myalgias.   Skin: Negative for rash and wound.   Neurological: Negative for tremors, seizures, weakness, numbness and headaches.   Hematological: Does not bruise/bleed easily.         Objective:        Physical Exam:   Foot Exam    General  General Appearance: appears stated age and healthy   Orientation: alert and oriented to person, place, and time   Affect: appropriate   Gait: antalgic       Right Foot/Ankle     Inspection and Palpation  Ecchymosis: none  Tenderness: plantar fascia   Swelling: none   Arch: normal  Skin Exam: skin intact; no drainage, no ulcer and no erythema   Neurovascular  Dorsalis pedis: 2+  Posterior tibial: 2+  Capillary Refill: 2+  Varicose veins: not present  Saphenous nerve sensation: normal  Tibial nerve sensation: normal  Superficial peroneal nerve sensation: normal  Deep peroneal nerve sensation: normal  Sural nerve sensation: normal    Edema  Type of edema: non-pitting    Muscle Strength  Ankle dorsiflexion: 5  Ankle plantar flexion: 5  Ankle inversion: 5  Ankle eversion: 5  Great toe extension: 5  Great toe flexion: 5    Range of Motion    Passive  Ankle dorsiflexion: 5, pain      Tests  Anterior drawer: negative   Talar tilt: negative   PT Tinel's sign: negative    Paresthesia: negative  Comments  Pain on palpation of the infeior medial heel and central plantar heel. No pain present  with side to side compression of the calcaneus. Negative tinnel's sign  at the tarsal tunnel. Negative Wilson's nerve pain. Negative Calcaneal nerve pain. No soft tissue masses.  Pain present with dorsiflexion of the ankle. No edema, erythema, or ecchymosis noted.       Left Foot/Ankle      Inspection and Palpation  Ecchymosis: none  Tenderness: plantar fascia   Swelling: none   Arch: normal  Skin Exam: skin intact; no drainage, no ulcer and no erythema   Neurovascular  Dorsalis pedis: 2+  Posterior tibial: 2+  Capillary refill: 2+  Varicose veins: not present  Saphenous nerve sensation: normal  Tibial nerve sensation: normal  Superficial peroneal nerve sensation: normal  Deep peroneal nerve sensation: normal  Sural nerve sensation: normal    Edema  Type of edema: non-pitting    Muscle Strength  Ankle dorsiflexion: 5  Ankle plantar flexion: 5  Ankle inversion: 5  Ankle eversion: 5  Great toe extension: 5  Great toe flexion: 5    Range of Motion    Passive  Ankle dorsiflexion: 5, pain      Tests  Anterior drawer: negative   Talar tilt: negative   PT Tinel's sign: negative  Paresthesia: negative  Comments  Pain on palpation of the infeior medial heel and central plantar heel. No pain present  with side to side compression of the calcaneus. Negative tinnel's sign  at the tarsal tunnel. Negative Wilson's nerve pain. Negative Calcaneal nerve pain. No soft tissue masses. Pain present with dorsiflexion of the ankle. No edema, erythema, or ecchymosis noted.     Physical Exam  Cardiovascular:      Pulses:           Dorsalis pedis pulses are 2+ on the right side and 2+ on the left side.        Posterior tibial pulses are 2+ on the right side and 2+ on the left side.   Feet:      Right foot:      Skin integrity: No ulcer or erythema.      Left foot:      Skin integrity: No ulcer or erythema.               Right Ankle/Foot Exam     Comments:  Pain on palpation of the infeior medial heel and central plantar heel. No pain present  with side to side compression of the calcaneus. Negative tinnel's sign  at the tarsal tunnel. Negative Wilson's nerve pain. Negative Calcaneal nerve pain. No soft tissue masses.  Pain present with dorsiflexion of the ankle. No edema, erythema, or ecchymosis noted.       Left Ankle/Foot Exam     Comments:  Pain on palpation of the infeior medial heel and central plantar heel. No pain present  with side to side compression of the calcaneus. Negative tinnel's sign  at the tarsal tunnel. Negative Wilson's nerve pain. Negative Calcaneal nerve pain. No soft tissue masses. Pain present with dorsiflexion of the ankle. No edema, erythema, or ecchymosis noted.       Muscle Strength   Right Lower Extremity   Ankle Dorsiflexion:  5   Plantar flexion:  5/5  Left Lower Extremity   Ankle Dorsiflexion:  5   Plantar flexion:  5/5     Vascular Exam     Right Pulses  Dorsalis Pedis:      2+  Posterior Tibial:      2+        Left Pulses  Dorsalis Pedis:      2+  Posterior Tibial:      2+             Imaging: X-Ray Foot Complete Bilateral  Narrative: EXAMINATION:  XR FOOT COMPLETE 3 VIEW BILATERAL    CLINICAL HISTORY:  Pain in right foot    TECHNIQUE:  AP, lateral, and oblique views of both feet were performed.    COMPARISON:  None    FINDINGS:  Mild bilateral hallux valgus more so on the right.  Small bunion formation more so on the right.  Mild degenerative change 1st metatarsophalangeal joint also more so on the right.  Plantar and dorsal calcaneal spurs bilaterally larger on the right.  No acute fracture or dislocation  Impression: As above    Electronically signed by: Elise Gomez MD  Date:    05/05/2022  Time:    16:54               Assessment:       1. Plantar fasciitis    2. Pain in both feet      Plan:   Plantar fasciitis  -     MRI Foot ([facility]) Left Without Contrast; Future; Expected date: 05/05/2022    Pain in both feet  -     X-Ray Foot Complete Bilateral  -     MRI Foot ([facility]) Left Without Contrast; Future; Expected date: 05/05/2022      Follow up pending MRI results.    Procedures        I discussed at length with the patient his chronic heel pain. I explained the typical progression of  conservative treatment for plantar fasciitis.    I discussed with him that given the chronic nature of his pain, and the extensive conservative treatments tried, I would recommend MRI for further evaluation.    I also discussed with him that at this point, surgery is likely the best option to resolve his plantar fasciitis.     Counseling:     I provided patient education verbally regarding:   Patient diagnosis, treatment options, as well as alternatives, risks, and benefits.     Discussed different treatment options for heel pain. I gave written and verbal instructions on heel cord stretching and this was demonstrated for the patient. Patient expressed understanding. Discussed wearing appropriate shoe gear and avoiding flats, slippers, sandals, barefoot, and sockfeet. Recommended arch supports. My recommendation for OTC supports is Spenco polysorb replacement insoles or patient may elect more aggressive treatment with prescription arch supports. We also discussed cortisone injections and NSAID therapy.       This note was created using Dragon voice recognition software that occasionally misinterpreted phrases or words.

## 2022-05-24 ENCOUNTER — HOSPITAL ENCOUNTER (OUTPATIENT)
Dept: RADIOLOGY | Facility: HOSPITAL | Age: 57
Discharge: HOME | End: 2022-05-24
Attending: PODIATRIST
Payer: COMMERCIAL

## 2022-05-24 DIAGNOSIS — M79.672 PAIN IN BOTH FEET: ICD-10-CM

## 2022-05-24 DIAGNOSIS — M79.671 PAIN IN BOTH FEET: ICD-10-CM

## 2022-05-24 DIAGNOSIS — M72.2 PLANTAR FASCIITIS: ICD-10-CM

## 2022-05-24 PROCEDURE — 73718 MRI LOWER EXTREMITY W/O DYE: CPT | Mod: TC,PO,LT

## 2022-05-31 ENCOUNTER — OFFICE VISIT (OUTPATIENT)
Dept: PODIATRY | Facility: CLINIC | Age: 57
End: 2022-05-31
Payer: COMMERCIAL

## 2022-05-31 VITALS — RESPIRATION RATE: 18 BRPM | HEIGHT: 71 IN | WEIGHT: 200 LBS | BODY MASS INDEX: 28 KG/M2

## 2022-05-31 DIAGNOSIS — M72.2 PLANTAR FASCIITIS: Primary | ICD-10-CM

## 2022-05-31 DIAGNOSIS — M79.671 PAIN IN BOTH FEET: ICD-10-CM

## 2022-05-31 DIAGNOSIS — M79.672 PAIN IN BOTH FEET: ICD-10-CM

## 2022-05-31 PROCEDURE — 3008F PR BODY MASS INDEX (BMI) DOCUMENTED: ICD-10-PCS | Mod: CPTII,S$GLB,, | Performed by: PODIATRIST

## 2022-05-31 PROCEDURE — 3008F BODY MASS INDEX DOCD: CPT | Mod: CPTII,S$GLB,, | Performed by: PODIATRIST

## 2022-05-31 PROCEDURE — 99214 PR OFFICE/OUTPT VISIT, EST, LEVL IV, 30-39 MIN: ICD-10-PCS | Mod: S$GLB,,, | Performed by: PODIATRIST

## 2022-05-31 PROCEDURE — 99214 OFFICE O/P EST MOD 30 MIN: CPT | Mod: S$GLB,,, | Performed by: PODIATRIST

## 2022-05-31 PROCEDURE — 1160F RVW MEDS BY RX/DR IN RCRD: CPT | Mod: CPTII,S$GLB,, | Performed by: PODIATRIST

## 2022-05-31 PROCEDURE — 1159F MED LIST DOCD IN RCRD: CPT | Mod: CPTII,S$GLB,, | Performed by: PODIATRIST

## 2022-05-31 PROCEDURE — 1159F PR MEDICATION LIST DOCUMENTED IN MEDICAL RECORD: ICD-10-PCS | Mod: CPTII,S$GLB,, | Performed by: PODIATRIST

## 2022-05-31 PROCEDURE — 1160F PR REVIEW ALL MEDS BY PRESCRIBER/CLIN PHARMACIST DOCUMENTED: ICD-10-PCS | Mod: CPTII,S$GLB,, | Performed by: PODIATRIST

## 2022-05-31 NOTE — PROGRESS NOTES
"  [address] [facility] [address] [place] [address]  Phone: [phone]   [phone]    Patient's PCP:Shen Mclean MD  Referring Provider: No ref. provider found    Subjective:      Chief Complaint:: Results (MRI results) and Follow-up (Plantar fasciitis)    HPI  Devan Celeste is a 56 y.o. male who presents today with a complaint of bilateral heel pain lasting for 3-4 years . Onset of symptoms unknown and reports no trauma.  Current symptoms include pain.  Aggravating factors are walking,weightbearing,prolong standing. Symptoms have progressed. He reports his pain has started to affect his lower back and hips.Treatment to date have included  injections, custom inserts, OTC inserts, several pairs of shoes, stretching, icing, 5 weeks of physical therapy all without relief.     Patient presents today to review MRI results and discuss further treatment options.         Vitals:    05/31/22 1610   Resp: 18   Weight: 90.7 kg (200 lb)   Height: 5' 11" (1.803 m)   PainSc:   5      Shoe Size: 9    Past Surgical History:   Procedure Laterality Date    GALLBLADDER SURGERY      OTHER SURGICAL HISTORY       History reviewed. No pertinent past medical history.  History reviewed. No pertinent family history.     Social History:   Marital Status:   Alcohol History:  has no history on file for alcohol use.  Tobacco History:  reports that he has never smoked. He has never used smokeless tobacco.  Drug History:  has no history on file for drug use.    Review of patient's allergies indicates:  No Known Allergies    Current Outpatient Medications   Medication Sig Dispense Refill    azaTHIOprine (IMURAN) 50 mg Tab       bacitracin 500 unit/gram ointment Apply topically 2 (two) times daily.      diclofenac sodium (VOLTAREN) 1 % Gel Apply 2 g topically 4 (four) times daily. 100 g 2    esomeprazole (NEXIUM) 40 MG capsule Nexium 40 mg capsule,delayed release   TAKE 1 CAPSULE BY MOUTH DAILY      mesalamine (LIALDA) " 1.2 gram TbEC        No current facility-administered medications for this visit.       Review of Systems   Constitutional: Negative for chills, fatigue, fever and unexpected weight change.   HENT: Negative for hearing loss and trouble swallowing.    Eyes: Negative for photophobia and visual disturbance.   Respiratory: Negative for cough, shortness of breath and wheezing.    Cardiovascular: Negative for chest pain, palpitations and leg swelling.   Gastrointestinal: Negative for abdominal pain and nausea.   Genitourinary: Negative for dysuria and frequency.   Musculoskeletal: Positive for back pain and gait problem. Negative for arthralgias, joint swelling and myalgias.   Skin: Negative for rash and wound.   Neurological: Negative for tremors, seizures, weakness, numbness and headaches.   Hematological: Does not bruise/bleed easily.         Objective:        Physical Exam:   Foot Exam    General  General Appearance: appears stated age and healthy   Orientation: alert and oriented to person, place, and time   Affect: appropriate   Gait: antalgic       Right Foot/Ankle     Inspection and Palpation  Ecchymosis: none  Tenderness: plantar fascia   Swelling: none   Arch: normal  Skin Exam: skin intact; no drainage, no ulcer and no erythema   Neurovascular  Dorsalis pedis: 2+  Posterior tibial: 2+  Capillary Refill: 2+  Varicose veins: not present  Saphenous nerve sensation: normal  Tibial nerve sensation: normal  Superficial peroneal nerve sensation: normal  Deep peroneal nerve sensation: normal  Sural nerve sensation: normal    Edema  Type of edema: non-pitting    Muscle Strength  Ankle dorsiflexion: 5  Ankle plantar flexion: 5  Ankle inversion: 5  Ankle eversion: 5  Great toe extension: 5  Great toe flexion: 5    Range of Motion    Passive  Ankle dorsiflexion: 5, pain      Tests  Anterior drawer: negative   Talar tilt: negative   PT Tinel's sign: negative    Paresthesia: negative  Comments  Pain on palpation of the  infeior medial heel and central plantar heel. No pain present  with side to side compression of the calcaneus. Negative tinnel's sign  at the tarsal tunnel. Negative Wilson's nerve pain. Negative Calcaneal nerve pain. No soft tissue masses. Pain present with dorsiflexion of the ankle. No edema, erythema, or ecchymosis noted.       Left Foot/Ankle      Inspection and Palpation  Ecchymosis: none  Tenderness: plantar fascia   Swelling: none   Arch: normal  Skin Exam: skin intact; no drainage, no ulcer and no erythema   Neurovascular  Dorsalis pedis: 2+  Posterior tibial: 2+  Capillary refill: 2+  Varicose veins: not present  Saphenous nerve sensation: normal  Tibial nerve sensation: normal  Superficial peroneal nerve sensation: normal  Deep peroneal nerve sensation: normal  Sural nerve sensation: normal    Edema  Type of edema: non-pitting    Muscle Strength  Ankle dorsiflexion: 5  Ankle plantar flexion: 5  Ankle inversion: 5  Ankle eversion: 5  Great toe extension: 5  Great toe flexion: 5    Range of Motion    Passive  Ankle dorsiflexion: 5, pain      Tests  Anterior drawer: negative   Talar tilt: negative   PT Tinel's sign: negative  Paresthesia: negative  Comments  Pain on palpation of the infeior medial heel and central plantar heel. No pain present  with side to side compression of the calcaneus. Negative tinnel's sign  at the tarsal tunnel. Negative Wilson's nerve pain. Negative Calcaneal nerve pain. No soft tissue masses. Pain present with dorsiflexion of the ankle. No edema, erythema, or ecchymosis noted.     Physical Exam  Cardiovascular:      Pulses:           Dorsalis pedis pulses are 2+ on the right side and 2+ on the left side.        Posterior tibial pulses are 2+ on the right side and 2+ on the left side.   Feet:      Right foot:      Skin integrity: No ulcer or erythema.      Left foot:      Skin integrity: No ulcer or erythema.               Right Ankle/Foot Exam     Comments:  Pain on palpation of the  infeior medial heel and central plantar heel. No pain present  with side to side compression of the calcaneus. Negative tinnel's sign  at the tarsal tunnel. Negative Wilson's nerve pain. Negative Calcaneal nerve pain. No soft tissue masses. Pain present with dorsiflexion of the ankle. No edema, erythema, or ecchymosis noted.       Left Ankle/Foot Exam     Comments:  Pain on palpation of the infeior medial heel and central plantar heel. No pain present  with side to side compression of the calcaneus. Negative tinnel's sign  at the tarsal tunnel. Negative Wilson's nerve pain. Negative Calcaneal nerve pain. No soft tissue masses. Pain present with dorsiflexion of the ankle. No edema, erythema, or ecchymosis noted.       Muscle Strength   Right Lower Extremity   Ankle Dorsiflexion:  5   Plantar flexion:  5/5  Left Lower Extremity   Ankle Dorsiflexion:  5   Plantar flexion:  5/5     Vascular Exam     Right Pulses  Dorsalis Pedis:      2+  Posterior Tibial:      2+        Left Pulses  Dorsalis Pedis:      2+  Posterior Tibial:      2+             Imaging: MRI Foot (Hindfoot) Left Without Contrast  MRI of the left hindfoot without contrast    HISTORY: Heel pain.    Multiplanar noncontrast imaging is performed.    There is mild degradation by motion. There is a small focus of marrow signal alteration within the base of the second metatarsal, likely degenerative. There is no evidence of fracture or pathologic marrow replacement. There is no significant joint effusion.    The major ankle tendons appear intact. No ligamentous abnormality is identified. There is mild edema within the soft tissues along the plantar aspect of the hindfoot.    IMPRESSION:    Degradation by motion.    Mild subcutaneous edema.    Minimal degenerative changes.    Electronically signed by:  Easton Stevens MD  5/24/2022 2:12 PM CDT Workstation: [id number]               Assessment:       1. Plantar fasciitis    2. Pain in both feet      Plan:   Plantar  fasciitis    Pain in both feet      Follow up Patient will be scheduled for outpatient surgery.    Procedures        MRI findings and images reviewed in detail with the patient.  I discussed that there are no additional findings of stress fracture or findings from Wilson's neuritis.  I did also discussed with the patient that MRI does not show significant inflammation surrounding the plantar fascia.  However, it does appear thickened and given the history of his condition it still indicates that he is suffering from plantar fasciitis.  I discussed all of the conservative treatments which have been suggested and tried by previous doctors over the last several years.  Given his failure of these conservative treatments I feel surgical intervention is the next step.  Patient wishes to proceed with surgical intervention of the left plantar fascia.    Patient was educated about the entire pre, edith and post-operative time period.  They  were educated about the pro's and con's of surgery.  All conservative measures have been exhausted. Patient understands the particular risks involved which may occur in connection with the procedure proposed including pain, swelling, infection, stiffness, decreased ROM, recurrence, rejection, numbness, delayed healing, scar formation.    Patient was educated that their diagnosis may be surgically treated.  The patient's problem will probably advance and usually will not get better without surgery.  All of the pre-operative treatment plans have been exhausted or will no longer be successful at this point in time.  Patient was told of the possible outcomes and expectations of the surgical procedure.  They  will need to be followed-up post-operatively.  Today, pictures were drawn, questions answered.      We discussed the following surgical procedures:  Endoscopic plantar fasciotomy left foot       Counseling:     I provided patient education verbally regarding:   Patient diagnosis, treatment  options, as well as alternatives, risks, and benefits.     Discussed different treatment options for heel pain. I gave written and verbal instructions on heel cord stretching and this was demonstrated for the patient. Patient expressed understanding. Discussed wearing appropriate shoe gear and avoiding flats, slippers, sandals, barefoot, and sockfeet. Recommended arch supports. My recommendation for OTC supports is Spenco polysorb replacement insoles or patient may elect more aggressive treatment with prescription arch supports. We also discussed cortisone injections and NSAID therapy.       This note was created using Dragon voice recognition software that occasionally misinterpreted phrases or words.

## 2022-06-01 NOTE — PATIENT INSTRUCTIONS

## 2022-06-02 ENCOUNTER — TELEPHONE (OUTPATIENT)
Dept: PODIATRY | Facility: CLINIC | Age: 57
End: 2022-06-02
Payer: COMMERCIAL

## 2022-06-02 DIAGNOSIS — M72.2 PLANTAR FASCIITIS OF LEFT FOOT: ICD-10-CM

## 2022-06-02 DIAGNOSIS — M79.671 PAIN IN BOTH FEET: ICD-10-CM

## 2022-06-02 DIAGNOSIS — M72.2 PLANTAR FASCIITIS: Primary | ICD-10-CM

## 2022-06-02 DIAGNOSIS — M79.672 PAIN IN BOTH FEET: ICD-10-CM

## 2022-07-08 ENCOUNTER — HOSPITAL ENCOUNTER (OUTPATIENT)
Dept: PREADMISSION TESTING | Facility: HOSPITAL | Age: 57
Discharge: HOME | End: 2022-07-08
Attending: PODIATRIST
Payer: COMMERCIAL

## 2022-07-08 ENCOUNTER — HOSPITAL ENCOUNTER (OUTPATIENT)
Dept: RADIOLOGY | Facility: HOSPITAL | Age: 57
Discharge: HOME | End: 2022-07-08
Attending: PODIATRIST
Payer: COMMERCIAL

## 2022-07-08 VITALS
WEIGHT: 199.94 LBS | OXYGEN SATURATION: 98 % | HEART RATE: 68 BPM | DIASTOLIC BLOOD PRESSURE: 78 MMHG | BODY MASS INDEX: 27.99 KG/M2 | TEMPERATURE: 98 F | HEIGHT: 71 IN | SYSTOLIC BLOOD PRESSURE: 116 MMHG | RESPIRATION RATE: 18 BRPM

## 2022-07-08 DIAGNOSIS — M72.2 PLANTAR FASCIITIS: ICD-10-CM

## 2022-07-08 DIAGNOSIS — Z01.818 PRE-OP TESTING: ICD-10-CM

## 2022-07-08 DIAGNOSIS — M79.672 PAIN IN BOTH FEET: ICD-10-CM

## 2022-07-08 DIAGNOSIS — M79.671 PAIN IN BOTH FEET: ICD-10-CM

## 2022-07-08 DIAGNOSIS — Z01.818 PRE-OP TESTING: Primary | ICD-10-CM

## 2022-07-08 LAB
ALBUMIN SERPL BCP-MCNC: 4.8 G/DL (ref 3.5–5.2)
ALP SERPL-CCNC: 52 U/L (ref 55–135)
ALT SERPL W/O P-5'-P-CCNC: 19 U/L (ref 10–44)
ANION GAP SERPL CALC-SCNC: 4 MMOL/L (ref 8–16)
AST SERPL-CCNC: 21 U/L (ref 10–40)
BASOPHILS # BLD AUTO: 0.07 K/UL (ref 0–0.2)
BASOPHILS NFR BLD: 0.7 % (ref 0–1.9)
BILIRUB SERPL-MCNC: 1 MG/DL (ref 0.1–1)
BUN SERPL-MCNC: 24 MG/DL (ref 6–20)
CALCIUM SERPL-MCNC: 9.8 MG/DL (ref 8.7–10.5)
CHLORIDE SERPL-SCNC: 102 MMOL/L (ref 95–110)
CO2 SERPL-SCNC: 31 MMOL/L (ref 23–29)
CREAT SERPL-MCNC: 1.2 MG/DL (ref 0.5–1.4)
DIFFERENTIAL METHOD: NORMAL
EOSINOPHIL # BLD AUTO: 0.3 K/UL (ref 0–0.5)
EOSINOPHIL NFR BLD: 2.7 % (ref 0–8)
ERYTHROCYTE [DISTWIDTH] IN BLOOD BY AUTOMATED COUNT: 14.1 % (ref 11.5–14.5)
EST. GFR  (AFRICAN AMERICAN): >60 ML/MIN/1.73 M^2
EST. GFR  (NON AFRICAN AMERICAN): >60 ML/MIN/1.73 M^2
GLUCOSE SERPL-MCNC: 85 MG/DL (ref 70–110)
HCT VFR BLD AUTO: 48.5 % (ref 40–54)
HGB BLD-MCNC: 16.6 G/DL (ref 14–18)
IMM GRANULOCYTES # BLD AUTO: 0.03 K/UL (ref 0–0.04)
IMM GRANULOCYTES NFR BLD AUTO: 0.3 % (ref 0–0.5)
LYMPHOCYTES # BLD AUTO: 2.6 K/UL (ref 1–4.8)
LYMPHOCYTES NFR BLD: 27.2 % (ref 18–48)
MCH RBC QN AUTO: 29.7 PG (ref 27–31)
MCHC RBC AUTO-ENTMCNC: 34.2 G/DL (ref 32–36)
MCV RBC AUTO: 87 FL (ref 82–98)
MONOCYTES # BLD AUTO: 0.9 K/UL (ref 0.3–1)
MONOCYTES NFR BLD: 9.7 % (ref 4–15)
NEUTROPHILS # BLD AUTO: 5.6 K/UL (ref 1.8–7.7)
NEUTROPHILS NFR BLD: 59.4 % (ref 38–73)
NRBC BLD-RTO: 0 /100 WBC
PLATELET # BLD AUTO: 151 K/UL (ref 150–450)
PMV BLD AUTO: 10.3 FL (ref 9.2–12.9)
POTASSIUM SERPL-SCNC: 4 MMOL/L (ref 3.5–5.1)
PROT SERPL-MCNC: 8.2 G/DL (ref 6–8.4)
RBC # BLD AUTO: 5.58 M/UL (ref 4.6–6.2)
SODIUM SERPL-SCNC: 137 MMOL/L (ref 136–145)
WBC # BLD AUTO: 9.42 K/UL (ref 3.9–12.7)

## 2022-07-08 PROCEDURE — 80053 COMPREHEN METABOLIC PANEL: CPT | Performed by: PODIATRIST

## 2022-07-08 PROCEDURE — 36415 COLL VENOUS BLD VENIPUNCTURE: CPT | Performed by: PODIATRIST

## 2022-07-08 PROCEDURE — 93010 ELECTROCARDIOGRAM REPORT: CPT | Mod: ,,, | Performed by: GENERAL PRACTICE

## 2022-07-08 PROCEDURE — 85025 COMPLETE CBC W/AUTO DIFF WBC: CPT | Performed by: PODIATRIST

## 2022-07-08 PROCEDURE — 71046 X-RAY EXAM CHEST 2 VIEWS: CPT | Mod: TC

## 2022-07-08 PROCEDURE — 93010 EKG 12-LEAD: ICD-10-PCS | Mod: ,,, | Performed by: GENERAL PRACTICE

## 2022-07-08 PROCEDURE — 93005 ELECTROCARDIOGRAM TRACING: CPT | Performed by: GENERAL PRACTICE

## 2022-07-08 NOTE — PRE-PROCEDURE INSTRUCTIONS
Pre op instructions given; npo after midnight; NPO after midnight; Will not take any meds am of OR; questions answered and verbalized understanding

## 2022-07-08 NOTE — DISCHARGE INSTRUCTIONS
To confirm, Your doctor has instructed you that surgery is scheduled for: July 14     Pre-Op will call the afternoon prior to surgery between 4:00 and 6:00 PM with the final arrival time.       1 Person can come with you the day of surgery.    Please park in the Garage Parking and come through front entrance.      GO TO REGISTRATION     After registration, proceed past gift shop and through glass door ( [facility]) Check in at the nurses station to the left.   Do not eat or drink anything after midnight the night before your surgery - THIS INCLUDES  WATER, GUM, MINTS AND CANDY.  YOU MAY BRUSH YOUR TEETH BUT DO NOT SWALLOW     TAKE ONLY THESE MEDICATIONS WITH A SMALL SIP OF WATER THE MORNING OF YOUR PROCEDURE: NONE       PLEASE NOTE:  The surgery schedule has many variables which may affect the time of your surgery case.  Family members should be available if your surgery time changes.  Plan to be here the day of your procedure between 4-6 hours.      DO NOT TAKE THESE MEDICATIONS 5-7 DAYS PRIOR to your procedure or per your surgeon's request: ASPIRIN, ALEVE, ADVIL, IBUPROFEN,  JESSICA SELTZER, BC , FISH OIL , VITAMIN E, HERBALS  (May take Tylenol)                                                        IMPORTANT INSTRUCTIONS      Do not smoke, vape or drink alcoholic beverages 24 hours prior to your procedure.  Shower the night before AND the morning of your procedure with a Chlorhexidine wash such as Hibiclens or Dial antibacterial soap from the neck down.   No lotions, powder or oils on your skin after you shower. DO NOT WEAR DEODORANT   Do not get it on your face or in your eyes.  You may use your own shampoo and face wash. This helps your skin to be as bacteria free as possible.    DO NOT remove hair from the surgery site.  Do not shave the incision site unless you are given specific instructions to do so.    Sleep in a bed with clean sheets.    Do not sleep with a pet in the bed.   If you wear contact  lenses, dentures, hearing aids or glasses, bring a container to put them in during surgery and give to a family member for safe keeping.    Please leave all jewelry, piercing's and valuables at home.   Wear rubber soled shoes (no flip flops).  If your doctor has scheduled you for an overnight stay, bring a small overnight bag with any personal items you need.    Make arrangements in advance for transportation home by a responsible adult.      You must make arrangements for transportation, TAXI'S, UBER'S OR LYFTS ARE NOT ALLOWED.        If you have any questions about these instructions, call (Monday - Friday) Pre-Op Admit  Nursing  at [phone] or the Pre-Op Day Surgery Unit at [phone].

## 2022-07-14 ENCOUNTER — ANESTHESIA EVENT (OUTPATIENT)
Dept: SURGERY | Facility: HOSPITAL | Age: 57
End: 2022-07-14
Payer: COMMERCIAL

## 2022-07-14 ENCOUNTER — HOSPITAL ENCOUNTER (OUTPATIENT)
Facility: HOSPITAL | Age: 57
Discharge: HOME | End: 2022-07-14
Attending: PODIATRIST | Admitting: PODIATRIST
Payer: COMMERCIAL

## 2022-07-14 ENCOUNTER — ANESTHESIA (OUTPATIENT)
Dept: SURGERY | Facility: HOSPITAL | Age: 57
End: 2022-07-14
Payer: COMMERCIAL

## 2022-07-14 VITALS
OXYGEN SATURATION: 98 % | RESPIRATION RATE: 17 BRPM | HEIGHT: 71 IN | TEMPERATURE: 98 F | SYSTOLIC BLOOD PRESSURE: 118 MMHG | WEIGHT: 199 LBS | DIASTOLIC BLOOD PRESSURE: 72 MMHG | BODY MASS INDEX: 27.86 KG/M2 | HEART RATE: 58 BPM

## 2022-07-14 DIAGNOSIS — M72.2 PLANTAR FASCIITIS: ICD-10-CM

## 2022-07-14 DIAGNOSIS — M79.672 PAIN IN BOTH FEET: ICD-10-CM

## 2022-07-14 DIAGNOSIS — M72.2 PLANTAR FASCIITIS OF LEFT FOOT: ICD-10-CM

## 2022-07-14 DIAGNOSIS — M79.671 PAIN IN BOTH FEET: ICD-10-CM

## 2022-07-14 PROCEDURE — 36000707: Performed by: PODIATRIST

## 2022-07-14 PROCEDURE — 27000673 HC TUBING BLOOD Y: Performed by: ANESTHESIOLOGY

## 2022-07-14 PROCEDURE — 37000008 HC ANESTHESIA 1ST 15 MINUTES: Performed by: PODIATRIST

## 2022-07-14 PROCEDURE — 37000009 HC ANESTHESIA EA ADD 15 MINS: Performed by: PODIATRIST

## 2022-07-14 PROCEDURE — 63600175 PHARM REV CODE 636 W HCPCS: Performed by: NURSE ANESTHETIST, CERTIFIED REGISTERED

## 2022-07-14 PROCEDURE — 29893 PR ANKLE SCOPE,PLANTAR FASCIOTOMY: ICD-10-PCS | Mod: LT,,, | Performed by: PODIATRIST

## 2022-07-14 PROCEDURE — 25000003 PHARM REV CODE 250: Performed by: PODIATRIST

## 2022-07-14 PROCEDURE — 36000706: Performed by: PODIATRIST

## 2022-07-14 PROCEDURE — 27200651 HC AIRWAY, LMA: Performed by: ANESTHESIOLOGY

## 2022-07-14 PROCEDURE — 25000003 PHARM REV CODE 250: Performed by: NURSE ANESTHETIST, CERTIFIED REGISTERED

## 2022-07-14 PROCEDURE — 27201423 OPTIME MED/SURG SUP & DEVICES STERILE SUPPLY: Performed by: PODIATRIST

## 2022-07-14 PROCEDURE — 27000671 HC TUBING MICROBORE EXT: Performed by: ANESTHESIOLOGY

## 2022-07-14 PROCEDURE — 71000015 HC POSTOP RECOV 1ST HR: Performed by: PODIATRIST

## 2022-07-14 PROCEDURE — 29893 ENDOSCOPIC PLANTR FASCIOTOMY: CPT | Mod: LT,,, | Performed by: PODIATRIST

## 2022-07-14 PROCEDURE — 63600175 PHARM REV CODE 636 W HCPCS: Performed by: PODIATRIST

## 2022-07-14 PROCEDURE — 27202107 HC XP QUATRO SENSOR: Performed by: ANESTHESIOLOGY

## 2022-07-14 PROCEDURE — C9290 INJ, BUPIVACAINE LIPOSOME: HCPCS | Performed by: PODIATRIST

## 2022-07-14 PROCEDURE — 71000033 HC RECOVERY, INTIAL HOUR: Performed by: PODIATRIST

## 2022-07-14 RX ORDER — ONDANSETRON 4 MG/1
8 TABLET, ORALLY DISINTEGRATING ORAL EVERY 8 HOURS PRN
Qty: 30 TABLET | Refills: 0 | Status: SHIPPED | OUTPATIENT
Start: 2022-07-14 | End: 2022-07-27

## 2022-07-14 RX ORDER — ACETAMINOPHEN 10 MG/ML
INJECTION, SOLUTION INTRAVENOUS
Status: DISCONTINUED | OUTPATIENT
Start: 2022-07-14 | End: 2022-07-14

## 2022-07-14 RX ORDER — FAMOTIDINE 10 MG/ML
INJECTION INTRAVENOUS
Status: DISCONTINUED | OUTPATIENT
Start: 2022-07-14 | End: 2022-07-14

## 2022-07-14 RX ORDER — DOXYCYCLINE 100 MG/1
100 CAPSULE ORAL 2 TIMES DAILY
Qty: 14 CAPSULE | Refills: 0 | Status: SHIPPED | OUTPATIENT
Start: 2022-07-14 | End: 2022-07-21

## 2022-07-14 RX ORDER — LIDOCAINE HYDROCHLORIDE 20 MG/ML
INJECTION, SOLUTION EPIDURAL; INFILTRATION; INTRACAUDAL; PERINEURAL
Status: DISCONTINUED | OUTPATIENT
Start: 2022-07-14 | End: 2022-07-14

## 2022-07-14 RX ORDER — MIDAZOLAM HYDROCHLORIDE 1 MG/ML
INJECTION INTRAMUSCULAR; INTRAVENOUS
Status: DISCONTINUED | OUTPATIENT
Start: 2022-07-14 | End: 2022-07-14

## 2022-07-14 RX ORDER — LIDOCAINE HYDROCHLORIDE 20 MG/ML
JELLY TOPICAL
Status: DISCONTINUED | OUTPATIENT
Start: 2022-07-14 | End: 2022-07-14

## 2022-07-14 RX ORDER — ONDANSETRON 4 MG/1
8 TABLET, ORALLY DISINTEGRATING ORAL EVERY 8 HOURS PRN
Status: DISCONTINUED | OUTPATIENT
Start: 2022-07-14 | End: 2022-07-14 | Stop reason: HOSPADM

## 2022-07-14 RX ORDER — ONDANSETRON 2 MG/ML
4 INJECTION INTRAMUSCULAR; INTRAVENOUS DAILY PRN
Status: DISCONTINUED | OUTPATIENT
Start: 2022-07-14 | End: 2022-07-14 | Stop reason: HOSPADM

## 2022-07-14 RX ORDER — BUPIVACAINE HYDROCHLORIDE 5 MG/ML
INJECTION, SOLUTION EPIDURAL; INTRACAUDAL
Status: DISCONTINUED | OUTPATIENT
Start: 2022-07-14 | End: 2022-07-14 | Stop reason: HOSPADM

## 2022-07-14 RX ORDER — HYDROCODONE BITARTRATE AND ACETAMINOPHEN 10; 325 MG/1; MG/1
1 TABLET ORAL EVERY 4 HOURS PRN
Status: DISCONTINUED | OUTPATIENT
Start: 2022-07-14 | End: 2022-07-14 | Stop reason: HOSPADM

## 2022-07-14 RX ORDER — FENTANYL CITRATE 50 UG/ML
INJECTION, SOLUTION INTRAMUSCULAR; INTRAVENOUS
Status: DISCONTINUED | OUTPATIENT
Start: 2022-07-14 | End: 2022-07-14

## 2022-07-14 RX ORDER — DEXAMETHASONE SODIUM PHOSPHATE 4 MG/ML
INJECTION, SOLUTION INTRA-ARTICULAR; INTRALESIONAL; INTRAMUSCULAR; INTRAVENOUS; SOFT TISSUE
Status: DISCONTINUED | OUTPATIENT
Start: 2022-07-14 | End: 2022-07-14

## 2022-07-14 RX ORDER — HYDROMORPHONE HYDROCHLORIDE 1 MG/ML
0.2 INJECTION, SOLUTION INTRAMUSCULAR; INTRAVENOUS; SUBCUTANEOUS EVERY 5 MIN PRN
Status: DISCONTINUED | OUTPATIENT
Start: 2022-07-14 | End: 2022-07-14 | Stop reason: HOSPADM

## 2022-07-14 RX ORDER — ONDANSETRON 2 MG/ML
INJECTION INTRAMUSCULAR; INTRAVENOUS
Status: DISCONTINUED | OUTPATIENT
Start: 2022-07-14 | End: 2022-07-14

## 2022-07-14 RX ORDER — PROMETHAZINE HYDROCHLORIDE 25 MG/1
25 TABLET ORAL EVERY 6 HOURS PRN
Status: DISCONTINUED | OUTPATIENT
Start: 2022-07-14 | End: 2022-07-14 | Stop reason: HOSPADM

## 2022-07-14 RX ORDER — HYDROCODONE BITARTRATE AND ACETAMINOPHEN 10; 325 MG/1; MG/1
1 TABLET ORAL EVERY 6 HOURS PRN
Qty: 30 TABLET | Refills: 0 | Status: SHIPPED | OUTPATIENT
Start: 2022-07-14 | End: 2022-07-27

## 2022-07-14 RX ORDER — MEPERIDINE HYDROCHLORIDE 50 MG/ML
12.5 INJECTION INTRAMUSCULAR; INTRAVENOUS; SUBCUTANEOUS EVERY 10 MIN PRN
Status: DISCONTINUED | OUTPATIENT
Start: 2022-07-14 | End: 2022-07-14 | Stop reason: HOSPADM

## 2022-07-14 RX ORDER — OXYCODONE HYDROCHLORIDE 5 MG/1
5 TABLET ORAL
Status: DISCONTINUED | OUTPATIENT
Start: 2022-07-14 | End: 2022-07-14 | Stop reason: HOSPADM

## 2022-07-14 RX ORDER — DIPHENHYDRAMINE HYDROCHLORIDE 50 MG/ML
12.5 INJECTION INTRAMUSCULAR; INTRAVENOUS
Status: DISCONTINUED | OUTPATIENT
Start: 2022-07-14 | End: 2022-07-14 | Stop reason: HOSPADM

## 2022-07-14 RX ORDER — PROPOFOL 10 MG/ML
VIAL (ML) INTRAVENOUS
Status: DISCONTINUED | OUTPATIENT
Start: 2022-07-14 | End: 2022-07-14

## 2022-07-14 RX ORDER — SODIUM CHLORIDE 0.9 G/100ML
IRRIGANT IRRIGATION
Status: DISCONTINUED | OUTPATIENT
Start: 2022-07-14 | End: 2022-07-14 | Stop reason: HOSPADM

## 2022-07-14 RX ORDER — SODIUM CHLORIDE 0.9 % (FLUSH) 0.9 %
10 SYRINGE (ML) INJECTION
Status: DISCONTINUED | OUTPATIENT
Start: 2022-07-14 | End: 2022-07-14 | Stop reason: HOSPADM

## 2022-07-14 RX ORDER — HYDROCODONE BITARTRATE AND ACETAMINOPHEN 5; 325 MG/1; MG/1
1 TABLET ORAL EVERY 4 HOURS PRN
Status: DISCONTINUED | OUTPATIENT
Start: 2022-07-14 | End: 2022-07-14 | Stop reason: HOSPADM

## 2022-07-14 RX ORDER — SODIUM CHLORIDE, SODIUM LACTATE, POTASSIUM CHLORIDE, CALCIUM CHLORIDE 600; 310; 30; 20 MG/100ML; MG/100ML; MG/100ML; MG/100ML
INJECTION, SOLUTION INTRAVENOUS CONTINUOUS PRN
Status: DISCONTINUED | OUTPATIENT
Start: 2022-07-14 | End: 2022-07-14

## 2022-07-14 RX ADMIN — FAMOTIDINE 20 MG: 10 INJECTION, SOLUTION INTRAVENOUS at 12:07

## 2022-07-14 RX ADMIN — LIDOCAINE HYDROCHLORIDE 5 ML: 20 JELLY TOPICAL at 11:07

## 2022-07-14 RX ADMIN — FENTANYL CITRATE 50 MCG: 50 INJECTION INTRAMUSCULAR; INTRAVENOUS at 11:07

## 2022-07-14 RX ADMIN — PROPOFOL 150 MG: 10 INJECTION, EMULSION INTRAVENOUS at 11:07

## 2022-07-14 RX ADMIN — LIDOCAINE HYDROCHLORIDE 80 MG: 20 INJECTION, SOLUTION INTRAVENOUS at 11:07

## 2022-07-14 RX ADMIN — MIDAZOLAM HYDROCHLORIDE 2 MG: 1 INJECTION, SOLUTION INTRAMUSCULAR; INTRAVENOUS at 11:07

## 2022-07-14 RX ADMIN — DEXTROSE 2 G: 50 INJECTION, SOLUTION INTRAVENOUS at 11:07

## 2022-07-14 RX ADMIN — ACETAMINOPHEN 1000 MG: 10 INJECTION, SOLUTION INTRAVENOUS at 12:07

## 2022-07-14 RX ADMIN — DEXAMETHASONE SODIUM PHOSPHATE 8 MG: 4 INJECTION, SOLUTION INTRAMUSCULAR; INTRAVENOUS at 12:07

## 2022-07-14 RX ADMIN — SODIUM CHLORIDE, SODIUM LACTATE, POTASSIUM CHLORIDE, AND CALCIUM CHLORIDE: .6; .31; .03; .02 INJECTION, SOLUTION INTRAVENOUS at 11:07

## 2022-07-14 RX ADMIN — SODIUM CHLORIDE, SODIUM LACTATE, POTASSIUM CHLORIDE, AND CALCIUM CHLORIDE: .6; .31; .03; .02 INJECTION, SOLUTION INTRAVENOUS at 12:07

## 2022-07-14 RX ADMIN — ONDANSETRON 4 MG: 2 INJECTION INTRAMUSCULAR; INTRAVENOUS at 11:07

## 2022-07-14 NOTE — TRANSFER OF CARE
"Anesthesia Transfer of Care Note    Patient: Devan Celeste    Procedure(s) Performed: Procedure(s) (LRB):  FASCIOTOMY, PLANTAR, ENDOSCOPIC (Left)    Patient location: PACU    Anesthesia Type: general    Transport from OR: Transported from OR on room air with adequate spontaneous ventilation    Post pain: adequate analgesia    Post assessment: no apparent anesthetic complications    Post vital signs: stable    Level of consciousness: awake and alert    Nausea/Vomiting: no nausea/vomiting    Complications: none    Transfer of care protocol was followed      Last vitals:   Visit Vitals  /73   Pulse 62   Temp 36.7 °C (98.1 °F) (Oral)   Resp 17   Ht 5' 11" (1.803 m)   Wt 90.3 kg (199 lb)   SpO2 96%   BMI 27.75 kg/m²     "

## 2022-07-14 NOTE — PLAN OF CARE
1330- pt is alert and oriented breathing even unlabored with no complaints of pain at this time. Good pulse noted to left foot. Pt has no complaints of pain. Dressing is clean and dry with no redness or swelling noted. 1400- pt tolerated po intake. He is refusing crutches at this time stating he has a walker at home. 1430- all discharge instructions gone over thoroughly with the pt and his wife. Good pulse to left foot. Dressing is clean and dry. Pt wheeled to his vehicle with no incident.

## 2022-07-14 NOTE — ANESTHESIA POSTPROCEDURE EVALUATION
Anesthesia Post Evaluation    Patient: Devan Celeste    Procedure(s) Performed: Procedure(s) (LRB):  FASCIOTOMY, PLANTAR, ENDOSCOPIC (Left)    Final Anesthesia Type: general      Patient location during evaluation: PACU  Patient participation: Yes- Able to Participate  Level of consciousness: awake and alert  Post-procedure vital signs: reviewed and stable  Pain management: adequate  Airway patency: patent    PONV status at discharge: No PONV  Anesthetic complications: no      Cardiovascular status: stable  Respiratory status: unassisted and spontaneous ventilation  Hydration status: euvolemic  Follow-up not needed.          Vitals Value Taken Time   /70 07/14/22 1315   Temp 36.2 °C (97.1 °F) 07/14/22 1315   Pulse 54 07/14/22 1321   Resp 17 07/14/22 1320   SpO2 97 % 07/14/22 1321   Vitals shown include unvalidated device data.      Event Time   Out of Recovery 13:23:01         Pain/Jacquelyn Score: Jacquelyn Score: 10 (7/14/2022  1:15 PM)

## 2022-07-14 NOTE — OP NOTE
Operative Report     Patient name: Devan Celeste   MRN: [id number]  Date of surgery: 7/14/2022    Surgeon: Ector Hernandez DPM   Assistant:  None    Preoperative diagnosis:  Plantar fasciitis left foot  Postoperative diagnosis:  Same as above  Procedure:  Endoscopic plantar fasciotomy left foot  Anesthesia:  General  Hemostasis:  Pneumatic ankle tourniquet at 250 mmHg  Estimated blood loss: 2 mL    Specimen:  None  Complications: None  Condition upon discharge: Stable    Procedure in detail:  Patient was brought the operating room placed the operating table in supine position.  Following induction general anesthesia a well-padded pneumatic ankle tourniquet was placed on the patient's left ankle and set at 250 mmHg.  Left foot was then prepped scrubbed and draped in normal aseptic manner.  Time-out was then called.  An Esmarch bandage was utilized to exsanguinate the left foot pneumatic ankle tourniquet was inflated to 250 mmHg.  At this time attention was directed the medial aspect of the patient's left foot where utilizing 15. Blade a small linear longitudinal incision was made along the medial heel at the site insertion of the plantar fascia into the calcaneus.  Blunt dissection was then carried out through the subcutaneous tissues until the medial band of the fascia was palpated.  At this time and elevator was passed across the plantar aspect of the plantar fascia from medial to lateral over to the lateral aspect of the foot.  A stab incision was then made in this area.  The elevator was then removed and a trocar was inserted through the 2 incisions.  At this time the endoscope was inserted into the lateral portal and the plantar fascia was visualized.  The hook blade was then placed into the medial portal.  This was carried to the medial central portion of the plantar fascia.  Under visualization with the endoscope a fasciotomy was performed.  Sole swipes were made with the hook and triangular blade plate.   Following the incision of the fascia there was noted to be good release with the underlying muscle belly well visualized.  Intraoperative pictures were taken with the endoscope.  The area was inspected and no remaining fibers were present from the medial plantar fascia band.  The endoscope and trocar were removed.  The wound was flushed with copious amounts of sterile saline.  At this time the plantar fascia was palpated and there was noted be no tension along the medial band.  The 2 incisions were closed utilizing 3-0 Prolene in a simple interrupted fashion.  20 cc of a one-to-one mixture of 0.5% Marcaine plain and Exparel was utilized a block of the surgical site.  The patient's foot was then dressed with Xeroform 4x4s Kerlix and a Ace wrap.  Pneumatic ankle tourniquet was deflated neurovascular status noted be intact all digits of the left foot.  Patient's foot was placed in a postoperative Cam Walker boot.  Patient tolerated the procedure well.  He had anesthesia reversed left the operating Room stable vitals.    1. Keep dressings, clean, dry, and intact to surgical extremity.  2. Rest, ice, and elevate the surgical extremity.  3. Minimal weight-bearing to surgical extremity in Cam Walker boot  4. Take all medication as discussed at discharge.  5. Contact the clinic with any postoperative concerns or complications.  6. Follow up in one week for 1st post op.

## 2022-07-14 NOTE — ANESTHESIA PREPROCEDURE EVALUATION
07/14/2022  Devan Celeste is a 56 y.o., male.      Pre-op Assessment    I have reviewed the Patient Summary Reports.     I have reviewed the Nursing Notes. I have reviewed the NPO Status.   I have reviewed the Medications.     Review of Systems  Anesthesia Hx:  Denies Family Hx of Anesthesia complications.   Denies Personal Hx of Anesthesia complications.   Social:  Non-Smoker, No Alcohol Use    Hematology/Oncology:  Hematology Normal   Oncology Normal     EENT/Dental:EENT/Dental Normal   Cardiovascular:  Cardiovascular Normal     Pulmonary:  Pulmonary Normal    Renal/:  Renal/ Normal     Hepatic/GI:   GERD ( rarely) Ulcerative colitis. No flare up currently.   Musculoskeletal:  Musculoskeletal Normal    Neurological:  Neurology Normal    Endocrine:  Endocrine Normal    Psych:  Psychiatric Normal           Physical Exam  General: Well nourished, Cooperative, Alert and Oriented    Airway:  Mallampati: III   Mouth Opening: Normal  TM Distance: > 6 cm  Tongue: Normal  Neck ROM: Normal ROM    Dental:  Intact    Chest/Lungs:  Clear to auscultation, Normal Respiratory Rate    Heart:  Rate: Normal  Rhythm: Regular Rhythm  Sounds: Normal        Anesthesia Plan  Type of Anesthesia, risks & benefits discussed:    Anesthesia Type: Gen Supraglottic Airway  Intra-op Monitoring Plan: Standard ASA Monitors  Post Op Pain Control Plan: multimodal analgesia  Induction:  IV  Airway Plan: , Post-Induction  Informed Consent: Informed consent signed with the Patient and all parties understand the risks and agree with anesthesia plan.  All questions answered.   ASA Score: 2  Anesthesia Plan Notes: LMA  Multimodal analgesia:  IV acetaminophen, Decadron 8 mg   Antiemetics:  Zofran, Pepcid    Ready For Surgery From Anesthesia Perspective.     .

## 2022-07-14 NOTE — ANESTHESIA PROCEDURE NOTES
Intubation    Date/Time: 7/14/2022 11:56 AM  Performed by: Ed Cunningham CRNA  Authorized by: Matt Blancas MD     Intubation:     Induction:  Intravenous    Intubated:  Postinduction    Mask Ventilation:  N/a    Attempts:  1    Attempted By:  CRNA    Difficult Airway Encountered?: No      Complications:  None    Airway Device:  Supraglottic airway/LMA    Airway Device Size:  5.0    Style/Cuff Inflation:  Cuffed    Secured at:  The lips    Placement Verified By:  Capnometry    Complicating Factors:  None    Findings Post-Intubation:  Atraumatic/condition of teeth unchanged

## 2022-07-14 NOTE — H&P
Devan Celeste is a 56 y.o., male.        Pre-op Assessment    I have reviewed the Patient Summary Reports.     I have reviewed the Nursing Notes. I have reviewed the NPO Status.   I have reviewed the Medications.      Review of Systems  Anesthesia Hx:  Denies Family Hx of Anesthesia complications.   Denies Personal Hx of Anesthesia complications.   Social:  Non-Smoker, No Alcohol Use    Hematology/Oncology:  Hematology Normal   Oncology Normal      EENT/Dental:EENT/Dental Normal   Cardiovascular:  Cardiovascular Normal     Pulmonary:  Pulmonary Normal    Renal/:  Renal/ Normal     Hepatic/GI:   GERD ( rarely) Ulcerative colitis. No flare up currently.   Musculoskeletal:  Musculoskeletal Normal    Neurological:  Neurology Normal    Endocrine:  Endocrine Normal    Psych:  Psychiatric Normal              Physical Exam  General: Well nourished, Cooperative, Alert and Oriented   Airway:  Mallampati: III   Mouth Opening: Normal  TM Distance: > 6 cm  Tongue: Normal  Neck ROM: Normal ROM   Dental:  Intact   Chest/Lungs:  Clear to auscultation, Normal Respiratory Rate   Heart:  Rate: Normal  Rhythm: Regular Rhythm  Sounds: Normal         A/P:     Left plantar fasciitis  Will proceed with endoscopic left plantar fascia release,

## 2022-07-20 ENCOUNTER — OFFICE VISIT (OUTPATIENT)
Dept: PODIATRY | Facility: CLINIC | Age: 57
End: 2022-07-20
Payer: COMMERCIAL

## 2022-07-20 VITALS — WEIGHT: 199 LBS | BODY MASS INDEX: 27.86 KG/M2 | HEART RATE: 72 BPM | HEIGHT: 71 IN | RESPIRATION RATE: 16 BRPM

## 2022-07-20 DIAGNOSIS — M72.2 PLANTAR FASCIITIS OF LEFT FOOT: Primary | ICD-10-CM

## 2022-07-20 DIAGNOSIS — M79.672 LEFT FOOT PAIN: ICD-10-CM

## 2022-07-20 PROCEDURE — 1159F PR MEDICATION LIST DOCUMENTED IN MEDICAL RECORD: ICD-10-PCS | Mod: CPTII,S$GLB,, | Performed by: PODIATRIST

## 2022-07-20 PROCEDURE — 1159F MED LIST DOCD IN RCRD: CPT | Mod: CPTII,S$GLB,, | Performed by: PODIATRIST

## 2022-07-20 PROCEDURE — 99024 PR POST-OP FOLLOW-UP VISIT: ICD-10-PCS | Mod: S$GLB,,, | Performed by: PODIATRIST

## 2022-07-20 PROCEDURE — 1160F PR REVIEW ALL MEDS BY PRESCRIBER/CLIN PHARMACIST DOCUMENTED: ICD-10-PCS | Mod: CPTII,S$GLB,, | Performed by: PODIATRIST

## 2022-07-20 PROCEDURE — 3008F PR BODY MASS INDEX (BMI) DOCUMENTED: ICD-10-PCS | Mod: CPTII,S$GLB,, | Performed by: PODIATRIST

## 2022-07-20 PROCEDURE — 3008F BODY MASS INDEX DOCD: CPT | Mod: CPTII,S$GLB,, | Performed by: PODIATRIST

## 2022-07-20 PROCEDURE — 99024 POSTOP FOLLOW-UP VISIT: CPT | Mod: S$GLB,,, | Performed by: PODIATRIST

## 2022-07-20 PROCEDURE — 1160F RVW MEDS BY RX/DR IN RCRD: CPT | Mod: CPTII,S$GLB,, | Performed by: PODIATRIST

## 2022-07-20 NOTE — PROGRESS NOTES
"  [address] [facility] [address] [place] [address]  Phone: [phone]   [phone]    Patient's PCP:Shen Mclean MD  Referring Provider:No ref. provider found    Subjective:      Chief Complaint: Post-op Evaluation ( Endoscopic plantar fasciotomy left foot)    Date of Surgery: 07/14/2022  Procedure:  Endoscopic plantar fasciotomy left foot    HPI:   Devan Celeste is a 56 y.o. male who returns to the clinic today for his post-operative visit. Devan Celeste rates pain a 0/10 on a pain scale. Compliance of Care: nonweightbearing in cam walker boot cast, using walker, ace wrap and dressing intact.     Vitals:    07/20/22 0847   Pulse: 72   Resp: 16   Weight: 90.3 kg (199 lb)   Height: 5' 11" (1.803 m)   PainSc: 0-No pain        Past Surgical History:   Procedure Laterality Date    ENDOSCOPIC PLANTAR FASCIOTOMY Left 7/14/2022    Procedure: FASCIOTOMY, PLANTAR, ENDOSCOPIC;  Surgeon: Ector Hernandez DPM;  Location: [facility];  Service: Podiatry;  Laterality: Left;  KIT    GALLBLADDER SURGERY      OTHER SURGICAL HISTORY       Past Medical History:   Diagnosis Date    COVID 2021    Plantar fascia syndrome     Ulcerative colitis      History reviewed. No pertinent family history.     Social History:   Marital Status:   Alcohol History:  has no history on file for alcohol use.  Tobacco History:  reports that he has never smoked. He has never used smokeless tobacco.  Drug History:  has no history on file for drug use.    Review of patient's allergies indicates:  No Known Allergies    Current Outpatient Medications   Medication Sig Dispense Refill    azaTHIOprine (IMURAN) 50 mg Tab       bacitracin 500 unit/gram ointment Apply topically 2 (two) times daily.      diclofenac sodium (VOLTAREN) 1 % Gel Apply 2 g topically 4 (four) times daily. 100 g 2    doxycycline (MONODOX) 100 MG capsule Take 1 capsule (100 mg total) by mouth 2 (two) times daily. for 7 days 14 capsule 0    esomeprazole (NEXIUM) 40 MG " capsule Nexium 40 mg capsule,delayed release   TAKE 1 CAPSULE BY MOUTH DAILY      HYDROcodone-acetaminophen (NORCO)  mg per tablet Take 1 tablet by mouth every 6 (six) hours as needed for Pain. 30 tablet 0    mesalamine (LIALDA) 1.2 gram TbEC       ondansetron (ZOFRAN-ODT) 4 MG TbDL Take 2 tablets (8 mg total) by mouth every 8 (eight) hours as needed (Nausea). 30 tablet 0     No current facility-administered medications for this visit.       Review of Systems   Constitutional: Negative for chills, fatigue, fever and unexpected weight change.   HENT: Negative for hearing loss and trouble swallowing.    Eyes: Negative for photophobia and visual disturbance.   Respiratory: Negative for cough, shortness of breath and wheezing.    Cardiovascular: Negative for chest pain, palpitations and leg swelling.   Gastrointestinal: Negative for abdominal pain and nausea.   Genitourinary: Negative for dysuria and frequency.   Musculoskeletal: Positive for back pain and gait problem. Negative for arthralgias, joint swelling and myalgias.   Skin: Negative for rash and wound.   Neurological: Negative for tremors, seizures, weakness, numbness and headaches.   Hematological: Does not bruise/bleed easily.         Objective:        Post-op surgery of the left foot with normal healing, no signs of infection or dehiscence of wound. Normal post op exam today. No redness, no drainage, no increase in local temperature, no significant swelling, sutures.steri-strips are intact.     Imaging: none     Physical Exam:   Foot Exam  Physical Exam       Assessment:       1. Plantar fasciitis of left foot    2. Left foot pain      Plan:   Plantar fasciitis of left foot    Left foot pain      Follow up in about 1 week (around 7/27/2022), or if symptoms worsen or fail to improve.    Procedures - None    The surgical dressing was removed showing no signs of infection, excess edema or malalignment. A new dry dressing was applied and patient was  instructed to leave dressing intact until next visit or until instructed to remove.     CAM walker boot with weight bearing  Ice to painful area, 15 minutes at a time  Ace-wrap to help with swelling  No barefoot   Keep affected extremity elevated while seated      This note was created using Dragon voice recognition software that occasionally misinterpreted phrases or words.

## 2022-07-20 NOTE — PATIENT INSTRUCTIONS

## 2022-07-27 ENCOUNTER — OFFICE VISIT (OUTPATIENT)
Dept: PODIATRY | Facility: CLINIC | Age: 57
End: 2022-07-27
Payer: COMMERCIAL

## 2022-07-27 VITALS — HEIGHT: 71 IN | BODY MASS INDEX: 27.86 KG/M2 | RESPIRATION RATE: 16 BRPM | WEIGHT: 199 LBS

## 2022-07-27 DIAGNOSIS — M72.2 PLANTAR FASCIITIS OF LEFT FOOT: Primary | ICD-10-CM

## 2022-07-27 DIAGNOSIS — M79.672 LEFT FOOT PAIN: ICD-10-CM

## 2022-07-27 PROCEDURE — 99024 PR POST-OP FOLLOW-UP VISIT: ICD-10-PCS | Mod: S$GLB,,, | Performed by: PODIATRIST

## 2022-07-27 PROCEDURE — 99024 POSTOP FOLLOW-UP VISIT: CPT | Mod: S$GLB,,, | Performed by: PODIATRIST

## 2022-07-27 PROCEDURE — 1160F PR REVIEW ALL MEDS BY PRESCRIBER/CLIN PHARMACIST DOCUMENTED: ICD-10-PCS | Mod: CPTII,S$GLB,, | Performed by: PODIATRIST

## 2022-07-27 PROCEDURE — 1159F PR MEDICATION LIST DOCUMENTED IN MEDICAL RECORD: ICD-10-PCS | Mod: CPTII,S$GLB,, | Performed by: PODIATRIST

## 2022-07-27 PROCEDURE — 1160F RVW MEDS BY RX/DR IN RCRD: CPT | Mod: CPTII,S$GLB,, | Performed by: PODIATRIST

## 2022-07-27 PROCEDURE — 3008F BODY MASS INDEX DOCD: CPT | Mod: CPTII,S$GLB,, | Performed by: PODIATRIST

## 2022-07-27 PROCEDURE — 3008F PR BODY MASS INDEX (BMI) DOCUMENTED: ICD-10-PCS | Mod: CPTII,S$GLB,, | Performed by: PODIATRIST

## 2022-07-27 PROCEDURE — 1159F MED LIST DOCD IN RCRD: CPT | Mod: CPTII,S$GLB,, | Performed by: PODIATRIST

## 2022-07-27 NOTE — PROGRESS NOTES
"  [address] [facility] [address] [place] [address]  Phone: [phone]   [phone]    Patient's PCP:Shen Mclean MD  Referring Provider:No ref. provider found    Subjective:      Chief Complaint: Post-op Evaluation    Date of Surgery: 07/14/2022  Procedure:  Endoscopic plantar fasciotomy left foot    HPI:   Devan Celeste is a 56 y.o. male who returns to the clinic today for his post-operative visit. Devan Celeste rates pain a 2/10 on a pain scale. Compliance of Care:  In boot cast with band-aid covering incision sites.    Vitals:    07/27/22 0933   Resp: 16   Weight: 90.3 kg (199 lb)   Height: 5' 11" (1.803 m)   PainSc:   2        Past Surgical History:   Procedure Laterality Date    ENDOSCOPIC PLANTAR FASCIOTOMY Left 7/14/2022    Procedure: FASCIOTOMY, PLANTAR, ENDOSCOPIC;  Surgeon: Ector Hernandez DPM;  Location: [facility];  Service: Podiatry;  Laterality: Left;  KIT    GALLBLADDER SURGERY      OTHER SURGICAL HISTORY       Past Medical History:   Diagnosis Date    COVID 2021    Plantar fascia syndrome     Ulcerative colitis      History reviewed. No pertinent family history.     Social History:   Marital Status:   Alcohol History:  has no history on file for alcohol use.  Tobacco History:  reports that he has never smoked. He has never used smokeless tobacco.  Drug History:  has no history on file for drug use.    Review of patient's allergies indicates:  No Known Allergies    Current Outpatient Medications   Medication Sig Dispense Refill    azaTHIOprine (IMURAN) 50 mg Tab       bacitracin 500 unit/gram ointment Apply topically 2 (two) times daily.      diclofenac sodium (VOLTAREN) 1 % Gel Apply 2 g topically 4 (four) times daily. 100 g 2    esomeprazole (NEXIUM) 40 MG capsule Nexium 40 mg capsule,delayed release   TAKE 1 CAPSULE BY MOUTH DAILY      mesalamine (LIALDA) 1.2 gram TbEC        No current facility-administered medications for this visit.       Review of Systems "   Constitutional: Negative for chills, fatigue, fever and unexpected weight change.   HENT: Negative for hearing loss and trouble swallowing.    Eyes: Negative for photophobia and visual disturbance.   Respiratory: Negative for cough, shortness of breath and wheezing.    Cardiovascular: Negative for chest pain, palpitations and leg swelling.   Gastrointestinal: Negative for abdominal pain and nausea.   Genitourinary: Negative for dysuria and frequency.   Musculoskeletal: Negative for arthralgias, back pain, gait problem, joint swelling and myalgias.   Skin: Negative for rash and wound.   Neurological: Negative for tremors, seizures, weakness, numbness and headaches.   Hematological: Does not bruise/bleed easily.         Objective:        Post-op surgery of the left foot with normal healing, no signs of infection or dehiscence of wound.  Normal post op exam today. No redness, no drainage, no increase in local temperature, no significant swelling, sutures.steri-strips are intact.     Imaging:  None    Physical Exam:   Foot Exam  Physical Exam       Assessment:       1. Plantar fasciitis of left foot    2. Left foot pain      Plan:   Plantar fasciitis of left foot    Left foot pain      Follow up in about 4 weeks (around 8/24/2022), or if symptoms worsen or fail to improve.    Procedures - sutures removed from the incisions.  Incisions are well healed.  Patient tolerated well.    Patient okay to begin showering.  He is also okay to increase his weight-bearing as symptoms allow.  He can transition from Cam Walker boot to normal shoe gear as symptoms allow.  Ice as needed for pain and swelling.  Limit heavy impact activities.    This note was created using Dragon voice recognition software that occasionally misinterpreted phrases or words.

## 2022-08-29 ENCOUNTER — OFFICE VISIT (OUTPATIENT)
Dept: PODIATRY | Facility: CLINIC | Age: 57
End: 2022-08-29
Payer: COMMERCIAL

## 2022-08-29 VITALS — HEART RATE: 64 BPM | BODY MASS INDEX: 29.82 KG/M2 | OXYGEN SATURATION: 96 % | WEIGHT: 213 LBS | HEIGHT: 71 IN

## 2022-08-29 DIAGNOSIS — M72.2 PLANTAR FASCIITIS OF LEFT FOOT: Primary | ICD-10-CM

## 2022-08-29 DIAGNOSIS — M79.672 LEFT FOOT PAIN: ICD-10-CM

## 2022-08-29 PROCEDURE — 1160F PR REVIEW ALL MEDS BY PRESCRIBER/CLIN PHARMACIST DOCUMENTED: ICD-10-PCS | Mod: CPTII,S$GLB,, | Performed by: PODIATRIST

## 2022-08-29 PROCEDURE — 3008F PR BODY MASS INDEX (BMI) DOCUMENTED: ICD-10-PCS | Mod: CPTII,S$GLB,, | Performed by: PODIATRIST

## 2022-08-29 PROCEDURE — 1160F RVW MEDS BY RX/DR IN RCRD: CPT | Mod: CPTII,S$GLB,, | Performed by: PODIATRIST

## 2022-08-29 PROCEDURE — 1159F PR MEDICATION LIST DOCUMENTED IN MEDICAL RECORD: ICD-10-PCS | Mod: CPTII,S$GLB,, | Performed by: PODIATRIST

## 2022-08-29 PROCEDURE — 99024 POSTOP FOLLOW-UP VISIT: CPT | Mod: S$GLB,,, | Performed by: PODIATRIST

## 2022-08-29 PROCEDURE — 1159F MED LIST DOCD IN RCRD: CPT | Mod: CPTII,S$GLB,, | Performed by: PODIATRIST

## 2022-08-29 PROCEDURE — 3008F BODY MASS INDEX DOCD: CPT | Mod: CPTII,S$GLB,, | Performed by: PODIATRIST

## 2022-08-29 PROCEDURE — 99024 PR POST-OP FOLLOW-UP VISIT: ICD-10-PCS | Mod: S$GLB,,, | Performed by: PODIATRIST

## 2022-08-29 NOTE — LETTER
August 29, 2022      [facility] - Podiatry  [address] [facility] [address]  [place] [address]  Phone: [phone]  Fax: [phone]       Patient: Devan Celeste   YOB: 1965  Date of Visit: 08/29/2022    To Whom It May Concern:    Barbra Celeste  was at [facility] on 08/29/2022. The patient may return to work/school on 09/16/2022 with restrictions. Patient must have limited to no climbing, limit heavy lifting and limit walking. Patient will return to clinic in 4 weeks for further follow up. If you have any questions or concerns, or if I can be of further assistance, please do not hesitate to contact me.    Sincerely,    Electronically Signed by: JOHN Santoyo RN

## 2022-08-29 NOTE — PROGRESS NOTES
"  [address] [facility] [address] [place] [address]  Phone: [phone]   [phone]    Patient's PCP:Shen Mclean MD  Referring Provider:No ref. provider found    Subjective:      Chief Complaint: Post-op Evaluation (endoscopic plantar fasciotomy left foot )          Date of Surgery: 7/14/2022  Procedure: endoscopic plantar fasciotomy left foot     HPI:   Devan Celeste is a 56 y.o. male who returns to the clinic today for his post-operative visit. Devan Celeste rates pain a 3/10 on a pain scale. Compliance of Care:  Patient presents in tennis shoes.  Patient states he is still having pain in the arch.  He states he has been alternating between Nike running shoes and a pair of Sauconys which are several years old.  He states that he did have several days of weight-bearing in his [employer] running shoes without pain.    Vitals:    08/29/22 0845   Pulse: 64   SpO2: 96%   Weight: 96.6 kg (213 lb)   Height: 5' 11" (1.803 m)   PainSc:   3        Past Surgical History:   Procedure Laterality Date    ENDOSCOPIC PLANTAR FASCIOTOMY Left 7/14/2022    Procedure: FASCIOTOMY, PLANTAR, ENDOSCOPIC;  Surgeon: Ector Hernandez DPM;  Location: [facility];  Service: Podiatry;  Laterality: Left;  KIT    GALLBLADDER SURGERY      OTHER SURGICAL HISTORY       Past Medical History:   Diagnosis Date    COVID 2021    Plantar fascia syndrome     Ulcerative colitis      History reviewed. No pertinent family history.     Social History:   Marital Status:   Alcohol History:  has no history on file for alcohol use.  Tobacco History:  reports that he has never smoked. He has never used smokeless tobacco.  Drug History:  has no history on file for drug use.    Review of patient's allergies indicates:  No Known Allergies    Current Outpatient Medications   Medication Sig Dispense Refill    azaTHIOprine (IMURAN) 50 mg Tab       bacitracin 500 unit/gram ointment Apply topically 2 (two) times daily.      diclofenac sodium (VOLTAREN) 1 % Gel " Apply 2 g topically 4 (four) times daily. 100 g 2    esomeprazole (NEXIUM) 40 MG capsule Nexium 40 mg capsule,delayed release   TAKE 1 CAPSULE BY MOUTH DAILY      mesalamine (LIALDA) 1.2 gram TbEC        No current facility-administered medications for this visit.       Review of Systems   Constitutional:  Negative for chills, fatigue, fever and unexpected weight change.   HENT:  Negative for hearing loss and trouble swallowing.    Eyes:  Negative for photophobia and visual disturbance.   Respiratory:  Negative for cough, shortness of breath and wheezing.    Cardiovascular:  Negative for chest pain, palpitations and leg swelling.   Gastrointestinal:  Negative for abdominal pain and nausea.   Genitourinary:  Negative for dysuria and frequency.   Musculoskeletal:  Negative for arthralgias, back pain, gait problem, joint swelling and myalgias.   Skin:  Negative for rash and wound.   Neurological:  Negative for tremors, seizures, weakness, numbness and headaches.   Hematological:  Does not bruise/bleed easily.       Objective:        Post-op surgery of the left plantar fascia with normal healing, no signs of infection or dehiscence of wound. Normal post op exam today. No redness, no drainage, no increase in local temperature, no significant swelling, surgical incisions are well healed.  Minimal tenderness with palpation of the proximal medial band plantar fascia.  No excess tension along the fascia is present.    Imaging:  None    Physical Exam:   Foot Exam  Physical Exam       Assessment:       1. Plantar fasciitis of left foot    2. Left foot pain      Plan:   Plantar fasciitis of left foot    Left foot pain    Follow up in about 4 weeks (around 9/26/2022), or if symptoms worsen or fail to improve.    Procedures - None    I discussed with the patient that overall his symptoms are progressing well.  I explained that it is normal to have some continued pain at the surgical site only 6 weeks postoperatively.  I did  discuss with him the importance of continuing to limit stress to the area through proper shoe gear.  I explained that the pair of running shoes that he is in today need to be updated as they are approximately 2 years old.  Also discussed proper arch support.  I also recommend that he stretch and ice.      At this time, plan will be for patient to return to work on Tuesday 09/06/2022.  Patient will return with restrictions of limited climbing, limited walking, and limits on heavy lifting.    This note was created using Dragon voice recognition software that occasionally misinterpreted phrases or words.

## 2022-09-01 ENCOUNTER — TELEPHONE (OUTPATIENT)
Dept: PODIATRY | Facility: CLINIC | Age: 57
End: 2022-09-01
Payer: COMMERCIAL

## 2022-09-01 NOTE — TELEPHONE ENCOUNTER
----- Message from Celestina Wade sent at 9/1/2022  4:50 PM CDT -----  Contact: Nelda  Type: Needs Medical Advice  Who Called: Nleda/ [employer]  Best Call Back Number:     Addtional Information: calling the office to ask when he can return to work.. the work release has restrictions.. they need a copy of the work release no later than tomorrow 09/02 so she can send it to the employer.. Can be faxed to  claim number [id number] or email: [email or web address]... please call and adv-

## 2022-09-26 ENCOUNTER — OFFICE VISIT (OUTPATIENT)
Dept: PODIATRY | Facility: CLINIC | Age: 57
End: 2022-09-26
Payer: COMMERCIAL

## 2022-09-26 VITALS — HEIGHT: 71 IN | WEIGHT: 213 LBS | BODY MASS INDEX: 29.82 KG/M2

## 2022-09-26 DIAGNOSIS — M79.672 PAIN IN BOTH FEET: ICD-10-CM

## 2022-09-26 DIAGNOSIS — M72.2 PLANTAR FASCIITIS OF LEFT FOOT: Primary | ICD-10-CM

## 2022-09-26 DIAGNOSIS — M79.672 LEFT FOOT PAIN: ICD-10-CM

## 2022-09-26 DIAGNOSIS — M79.671 PAIN IN BOTH FEET: ICD-10-CM

## 2022-09-26 PROCEDURE — 1159F PR MEDICATION LIST DOCUMENTED IN MEDICAL RECORD: ICD-10-PCS | Mod: CPTII,S$GLB,, | Performed by: PODIATRIST

## 2022-09-26 PROCEDURE — 3008F PR BODY MASS INDEX (BMI) DOCUMENTED: ICD-10-PCS | Mod: CPTII,S$GLB,, | Performed by: PODIATRIST

## 2022-09-26 PROCEDURE — 1159F MED LIST DOCD IN RCRD: CPT | Mod: CPTII,S$GLB,, | Performed by: PODIATRIST

## 2022-09-26 PROCEDURE — 99024 PR POST-OP FOLLOW-UP VISIT: ICD-10-PCS | Mod: S$GLB,,, | Performed by: PODIATRIST

## 2022-09-26 PROCEDURE — 99024 POSTOP FOLLOW-UP VISIT: CPT | Mod: S$GLB,,, | Performed by: PODIATRIST

## 2022-09-26 PROCEDURE — 1160F PR REVIEW ALL MEDS BY PRESCRIBER/CLIN PHARMACIST DOCUMENTED: ICD-10-PCS | Mod: CPTII,S$GLB,, | Performed by: PODIATRIST

## 2022-09-26 PROCEDURE — 3008F BODY MASS INDEX DOCD: CPT | Mod: CPTII,S$GLB,, | Performed by: PODIATRIST

## 2022-09-26 PROCEDURE — 1160F RVW MEDS BY RX/DR IN RCRD: CPT | Mod: CPTII,S$GLB,, | Performed by: PODIATRIST

## 2022-09-26 NOTE — PROGRESS NOTES
"  [address] [facility] [address] [place] [address]  Phone: [phone]   [phone]    Patient's PCP:Shen Mclean MD  Referring Provider:No ref. provider found    Subjective:      Chief Complaint: Post-op Evaluation    Date of Surgery: 07/14/2022  Procedure:  Endoscopic plantar fasciotomy left foot    HPI:   Devan Celeste is a 56 y.o. male who returns to the clinic today for his post-operative visit. Devan Celeste rates pain a 3/10 on a pain scale. Patient presents in regular shoes.  Patient has returned to work and is on light duty.  States he is still having some pain in the left foot.  He states he is having some pain in the right heel as well.  He has been wearing work boots.    Vitals:    09/26/22 1553   Weight: 96.6 kg (213 lb)   Height: 5' 11" (1.803 m)   PainSc:   4        Past Surgical History:   Procedure Laterality Date    ENDOSCOPIC PLANTAR FASCIOTOMY Left 7/14/2022    Procedure: FASCIOTOMY, PLANTAR, ENDOSCOPIC;  Surgeon: Ector Hernandez DPM;  Location: [facility];  Service: Podiatry;  Laterality: Left;  KIT    GALLBLADDER SURGERY      OTHER SURGICAL HISTORY       Past Medical History:   Diagnosis Date    COVID 2021    Plantar fascia syndrome     Ulcerative colitis      History reviewed. No pertinent family history.     Social History:   Marital Status:   Alcohol History:  has no history on file for alcohol use.  Tobacco History:  reports that he has never smoked. He has never used smokeless tobacco.  Drug History:  has no history on file for drug use.    Review of patient's allergies indicates:  No Known Allergies    Current Outpatient Medications   Medication Sig Dispense Refill    azaTHIOprine (IMURAN) 50 mg Tab       bacitracin 500 unit/gram ointment Apply topically 2 (two) times daily.      diclofenac sodium (VOLTAREN) 1 % Gel Apply 2 g topically 4 (four) times daily. 100 g 2    esomeprazole (NEXIUM) 40 MG capsule Nexium 40 mg capsule,delayed release   TAKE 1 CAPSULE BY MOUTH DAILY  "     mesalamine (LIALDA) 1.2 gram TbEC        No current facility-administered medications for this visit.       Review of Systems   Constitutional:  Negative for chills, fatigue, fever and unexpected weight change.   HENT:  Negative for hearing loss and trouble swallowing.    Eyes:  Negative for photophobia and visual disturbance.   Respiratory:  Negative for cough, shortness of breath and wheezing.    Cardiovascular:  Negative for chest pain, palpitations and leg swelling.   Gastrointestinal:  Negative for abdominal pain and nausea.   Genitourinary:  Negative for dysuria and frequency.   Musculoskeletal:  Negative for arthralgias, back pain, gait problem, joint swelling and myalgias.   Skin:  Negative for rash and wound.   Neurological:  Negative for tremors, seizures, weakness, numbness and headaches.   Hematological:  Does not bruise/bleed easily.       Objective:        Post-op surgery of the left foot with normal healing, no signs of infection or dehiscence of wound. Normal post op exam today. No redness, no drainage, no increase in local temperature, no significant swelling, surgical incisions are well healed.  There is very mild tenderness at the inferior medial arch.  No pain with range of motion of the foot or ankle.  No excess tension palpated along the plantar fascia.      Imaging:  None     Physical Exam:   Foot Exam  Physical Exam       Assessment:       1. Plantar fasciitis of left foot    2. Left foot pain    3. Pain in both feet      Plan:   Plantar fasciitis of left foot  -     Ambulatory referral/consult to Physical/Occupational Therapy; Future; Expected date: 10/03/2022    Left foot pain    Pain in both feet  -     Ambulatory referral/consult to Physical/Occupational Therapy; Future; Expected date: 10/03/2022    Follow up in about 4 weeks (around 10/24/2022), or if symptoms worsen or fail to improve.    Procedures - None    I discussed the patient that his symptoms are progressing and it is still  normal to have some discomfort postoperatively at this juncture.  However, I do believe there would be benefit and physical therapy to work on decreasing inflammation.  I am going to refer patient for outpatient physical therapy for the next 4 weeks.  I also recommend that he at [facility] supports to his work boots as they currently have none.  Patient will continue on light duty for at least 2 more weeks depending on how his symptoms progress.    This note was created using Dragon voice recognition software that occasionally misinterpreted phrases or words.

## 2024-06-05 ENCOUNTER — OFFICE VISIT (OUTPATIENT)
Dept: FAMILY MEDICINE | Facility: CLINIC | Age: 59
End: 2024-06-05
Payer: COMMERCIAL

## 2024-06-05 VITALS
HEART RATE: 70 BPM | BODY MASS INDEX: 28.51 KG/M2 | WEIGHT: 203.63 LBS | HEIGHT: 71 IN | DIASTOLIC BLOOD PRESSURE: 70 MMHG | SYSTOLIC BLOOD PRESSURE: 110 MMHG | OXYGEN SATURATION: 97 %

## 2024-06-05 DIAGNOSIS — R53.83 FATIGUE, UNSPECIFIED TYPE: ICD-10-CM

## 2024-06-05 DIAGNOSIS — Z12.5 ENCOUNTER FOR SCREENING FOR MALIGNANT NEOPLASM OF PROSTATE: ICD-10-CM

## 2024-06-05 DIAGNOSIS — M1A.9XX1 GOUTY TOPHUS: ICD-10-CM
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
## 2024-06-05 DIAGNOSIS — S30.861A TICK BITE OF ABDOMEN, INITIAL ENCOUNTER: ICD-10-CM

## 2024-06-05 DIAGNOSIS — K51.919 ULCERATIVE COLITIS WITH COMPLICATION, UNSPECIFIED LOCATION: ICD-10-CM

## 2024-06-05 DIAGNOSIS — R79.9 ABNORMAL FINDING OF BLOOD CHEMISTRY, UNSPECIFIED: ICD-10-CM

## 2024-06-05 DIAGNOSIS — Z00.00 WELLNESS EXAMINATION: ICD-10-CM

## 2024-06-05 DIAGNOSIS — Z76.89 ENCOUNTER TO ESTABLISH CARE: Primary | ICD-10-CM

## 2024-06-05 PROCEDURE — 1159F MED LIST DOCD IN RCRD: CPT | Mod: CPTII,S$GLB,,

## 2024-06-05 PROCEDURE — 1160F RVW MEDS BY RX/DR IN RCRD: CPT | Mod: CPTII,S$GLB,,

## 2024-06-05 PROCEDURE — 3074F SYST BP LT 130 MM HG: CPT | Mod: CPTII,S$GLB,,

## 2024-06-05 PROCEDURE — 3044F HG A1C LEVEL LT 7.0%: CPT | Mod: CPTII,S$GLB,,

## 2024-06-05 PROCEDURE — 3008F BODY MASS INDEX DOCD: CPT | Mod: CPTII,S$GLB,,

## 2024-06-05 PROCEDURE — 99204 OFFICE O/P NEW MOD 45 MIN: CPT | Mod: S$GLB,,,

## 2024-06-05 PROCEDURE — 3078F DIAST BP <80 MM HG: CPT | Mod: CPTII,S$GLB,,

## 2024-06-05 NOTE — PATIENT INSTRUCTIONS
Galen Hartman,     If you are due for any health screening(s) below please notify me so we can arrange them to be ordered and scheduled. Most healthy patients at your age complete them, but you are free to accept or refuse.     If you can't do it, I'll definitely understand. If you can, I'd certainly appreciate it!    Tests to Keep You Healthy    Colon Cancer Screening: DUE      Its time for your colon cancer screening     Colorectal cancer is one of the leading causes of cancer death for men and women but it doesnt have to be. Screenings can prevent colorectal cancer or find it early enough to treat and cure the disease.     Our records indicate that you may be overdue for colon cancer screening. A colonoscopy or stool screening test can help identify patients at risk for developing colon cancer. Cancer screenings save lives, so schedule yours today to stay healthy.     A colonoscopy is the preferred test for detecting colon cancer. It is needed only once every 10 years if results are negative. While you are sedated, a flexible, lighted tube with a tiny camera is inserted into the rectum and advanced through the colon to look for cancers.     An alternative screening test that is used at home and returned to the lab may also be used. It detects hidden blood in bowel movements which could indicate cancer in the colon. If results are positive, you will need a colonoscopy to determine if the blood is a sign of cancer. This type of follow up (diagnostic) colonoscopy usually requires additional copays as required by your insurance provider.     If you recently had your colon cancer screening performed outside of [facility], please let your Health care team know so that they can update your health record. Please contact your PCP if you have any questions.

## 2024-06-05 NOTE — LETTER
[address] [facility] [address]  [place] [address]  Phone: [phone]   [phone]                        MD Dorita Cummings MD Chequita Williams, MD Matthew Bassett, PAMO Smith, ADI Ellis, ADI Robin, ADI Christianson, CHIO      Date: 06/05/2024        Patient: Devan Celeste  YOB: 1965      Please fax over the patient's most recent colonoscopy.         Sincerely,     KAYLEIGH Drake, MILKA-CNP

## 2024-06-05 NOTE — PROGRESS NOTES
"  SUBJECTIVE:    Patient ID: Devan Celeste is a 58 y.o. male.    Chief Complaint: Establish Care (No bottles//Pt is here to establish care with a PCP//Pt would like to discuss intermittent light headedness x 2 week, also discuss diarrhea x 2 days, hx of colitis//Last colonoscopy with Dr. Caraballo//CORTES )    58 year old male presents today to establish care.   Past medical, surgical, social and pertinent family history reviewed. Current medications and allergies reviewed.    Report he has been having diarrhea for a few days, was watery, yellow, not foul odor, no nausea no vomiting, some tenderness upper abdomen, no blood. Took imodium and has improved some. Thought is was episode of his UC but his wife has had similar symptoms, so he thinks maybe infectious.   He has had some lightheadedness. We discussed whether he thinks he could possibly be a little dehydrated from the loose stools.     Has history of having tick bites a few months ago. Went hunting in [place] and ended up noting at least 8 ticks that he had to remove from himself. He does not recall having any notable rash. No joint pain that he felt was different than his usual arthritis. No fever. He has been fatigued, and is concerned for Lyme disease. He would like to be tested.     He works as a a .     Has a suspicious lesion on knuckle of his left hand. He states it has been there for a "while." He thought is was some type of abscess and he has been "digging in it." It is now scabbed but erythematous and mildly edematous. No purulent drainage. He does have a history of gout. He does not recall the lump ever having a white or yellow center, or nodule.    Hepatitis C Screening Never done  TETANUS VACCINE Never done  Colorectal Cancer Screening Never done  Shingles Vaccine(1 of 2) Never done  Lipid Panel due on 06/07/2029             Office Visit on 06/05/2024   Component Date Value Ref Range Status    Lyme Disease Ab (IGG) WB 06/07/2024 NEGATIVE  " NEGATIVE Final    18 KD (IGG) Band 06/07/2024 REACTIVE (A)   Final    23 KD (IGG) Band 06/07/2024 NON-REACTIVE   Final    28 KD (IGG) Band 06/07/2024 NON-REACTIVE   Final    30 Kd (IGG) Band 06/07/2024 NON-REACTIVE   Final    39 KD (IGG) Band 06/07/2024 NON-REACTIVE   Final    41 KD (IGG) Band 06/07/2024 REACTIVE (A)   Final    45 KD (IGG) Band 06/07/2024 NON-REACTIVE   Final    58 KD (IGG) Band 06/07/2024 NON-REACTIVE   Final    66 KD (IGG) Band 06/07/2024 NON-REACTIVE   Final    93 KD (IGG) Band 06/07/2024 NON-REACTIVE   Final    Lyme Disease Ab (IGM) WB 06/07/2024 POSITIVE (A)  NEGATIVE Final    23 KD (IGM) Band 06/07/2024 REACTIVE (A)   Final    39 KD (IGM) Band 06/07/2024 REACTIVE (A)   Final    41 KD (IGM) Band 06/07/2024 NON-REACTIVE   Final    WBC 06/07/2024 7.1  3.8 - 10.8 Thousand/uL Final    RBC 06/07/2024 5.64  4.20 - 5.80 Million/uL Final    Hemoglobin 06/07/2024 16.5  13.2 - 17.1 g/dL Final    Hematocrit 06/07/2024 49.6  38.5 - 50.0 % Final    MCV 06/07/2024 87.9  80.0 - 100.0 fL Final    MCH 06/07/2024 29.3  27.0 - 33.0 pg Final    MCHC 06/07/2024 33.3  32.0 - 36.0 g/dL Final    RDW 06/07/2024 13.1  11.0 - 15.0 % Final    Platelets 06/07/2024 138 (L)  140 - 400 Thousand/uL Final    MPV 06/07/2024 10.6  7.5 - 12.5 fL Final    Neutrophils, Abs 06/07/2024 4,146  1,500 - 7,800 cells/uL Final    Lymph # 06/07/2024 2,002  850 - 3,900 cells/uL Final    Mono # 06/07/2024 682  200 - 950 cells/uL Final    Eos # 06/07/2024 192  15 - 500 cells/uL Final    Baso # 06/07/2024 78  0 - 200 cells/uL Final    Neutrophils Relative 06/07/2024 58.4  % Final    Lymph % 06/07/2024 28.2  % Final    Mono % 06/07/2024 9.6  % Final    Eosinophil % 06/07/2024 2.7  % Final    Basophil % 06/07/2024 1.1  % Final    Glucose 06/07/2024 83  65 - 99 mg/dL Final    BUN 06/07/2024 19  7 - 25 mg/dL Final    Creatinine 06/07/2024 1.05  0.70 - 1.30 mg/dL Final    eGFR 06/07/2024 83  > OR = 60 mL/min/1.73m2 Final    BUN/Creatinine Ratio  06/07/2024 SEE NOTE:  6 - 22 (calc) Final    Sodium 06/07/2024 140  135 - 146 mmol/L Final    Potassium 06/07/2024 4.4  3.5 - 5.3 mmol/L Final    Chloride 06/07/2024 105  98 - 110 mmol/L Final    CO2 06/07/2024 29  20 - 32 mmol/L Final    Calcium 06/07/2024 9.2  8.6 - 10.3 mg/dL Final    Total Protein 06/07/2024 6.8  6.1 - 8.1 g/dL Final    Albumin 06/07/2024 4.1  3.6 - 5.1 g/dL Final    Globulin, Total 06/07/2024 2.7  1.9 - 3.7 g/dL (calc) Final    Albumin/Globulin Ratio 06/07/2024 1.5  1.0 - 2.5 (calc) Final    Total Bilirubin 06/07/2024 0.8  0.2 - 1.2 mg/dL Final    Alkaline Phosphatase 06/07/2024 53  35 - 144 U/L Final    AST 06/07/2024 14  10 - 35 U/L Final    ALT 06/07/2024 10  9 - 46 U/L Final    Cholesterol 06/07/2024 164  <200 mg/dL Final    HDL 06/07/2024 42  > OR = 40 mg/dL Final    Triglycerides 06/07/2024 91  <150 mg/dL Final    LDL Cholesterol 06/07/2024 103 (H)  mg/dL (calc) Final    HDL/Cholesterol Ratio 06/07/2024 3.9  <5.0 (calc) Final    Non HDL Chol. (LDL+VLDL) 06/07/2024 122  <130 mg/dL (calc) Final    TESTOSTERONE, TOTAL, MALE 06/07/2024 687  250 - 827 ng/dL Final    Iron 06/07/2024 104  50 - 180 mcg/dL Final    TIBC 06/07/2024 275  250 - 425 mcg/dL (calc) Final    Iron Saturation 06/07/2024 38  20 - 48 % (calc) Final    Uric Acid 06/07/2024 6.0  4.0 - 8.0 mg/dL Final    Ferritin 06/07/2024 177  38 - 380 ng/mL Final    Hemoglobin A1C 06/07/2024 5.4  <5.7 % of total Hgb Final    PROSTATE SPECIFIC ANTIGEN, SCR - Q* 06/07/2024 3.99  < OR = 4.00 ng/mL Final    T4, Free 06/07/2024 1.1  0.8 - 1.8 ng/dL Final    TSH 06/07/2024 2.57  0.40 - 4.50 mIU/L Final    Color, UA 06/07/2024 YELLOW  YELLOW Final    Appearance, UA 06/07/2024 CLEAR  CLEAR Final    Specific Gravity, UA 06/07/2024 1.019  1.001 - 1.035 Final    pH, UA 06/07/2024 7.5  5.0 - 8.0 Final    Glucose, UA 06/07/2024 NEGATIVE  NEGATIVE Final    Bilirubin, UA 06/07/2024 NEGATIVE  NEGATIVE Final    Ketones, UA 06/07/2024 NEGATIVE  NEGATIVE Final     Occult Blood UA 06/07/2024 NEGATIVE  NEGATIVE Final    Protein, UA 06/07/2024 NEGATIVE  NEGATIVE Final    Nitrite, UA 06/07/2024 NEGATIVE  NEGATIVE Final    Leukocytes, UA 06/07/2024 NEGATIVE  NEGATIVE Final    WBC Casts, UA 06/07/2024 NONE SEEN  < OR = 5 /HPF Final    RBC Casts, UA 06/07/2024 NONE SEEN  < OR = 2 /HPF Final    Squam Epithel, UA 06/07/2024 NONE SEEN  < OR = 5 /HPF Final    Bacteria, UA 06/07/2024 NONE SEEN  NONE SEEN /HPF Final    Hyaline Casts, UA 06/07/2024 NONE SEEN  NONE SEEN /LPF Final    Service Cmt: 06/07/2024 SEE COMMENT   Final    Reflexive Urine Culture 06/07/2024 SEE COMMENT   Final       Past Medical History:   Diagnosis Date    COVID 2021    Plantar fascia syndrome     Ulcerative colitis      Social History     Socioeconomic History    Marital status:    Tobacco Use    Smoking status: Never     Passive exposure: Never    Smokeless tobacco: Never   Substance and Sexual Activity    Alcohol use: Never    Drug use: Never     Past Surgical History:   Procedure Laterality Date    ENDOSCOPIC PLANTAR FASCIOTOMY Left 7/14/2022    Procedure: FASCIOTOMY, PLANTAR, ENDOSCOPIC;  Surgeon: Ector Hernandez DPM;  Location: [facility];  Service: Podiatry;  Laterality: Left;  KIT    GALLBLADDER SURGERY      OTHER SURGICAL HISTORY       Family History   Problem Relation Name Age of Onset    Heart disease Father         Review of patient's allergies indicates:  No Known Allergies    Current Outpatient Medications:     doxycycline (MONODOX) 100 MG capsule, Take 1 capsule (100 mg total) by mouth 2 (two) times daily. for 10 days, Disp: 20 capsule, Rfl: 0    mesalamine (LIALDA) 1.2 gram TbEC, , Disp: , Rfl:     Review of Systems   Constitutional:  Positive for fatigue. Negative for activity change, appetite change, chills, fever and unexpected weight change.   HENT:  Negative for nasal congestion, ear pain, rhinorrhea, sore throat and trouble swallowing.    Eyes:  Negative for pain, discharge and visual  "disturbance.   Respiratory:  Negative for cough, chest tightness, shortness of breath and wheezing.    Cardiovascular:  Negative for chest pain, palpitations and leg swelling.   Gastrointestinal:  Positive for diarrhea. Negative for abdominal pain, blood in stool, constipation, nausea, vomiting and reflux.   Genitourinary:  Negative for bladder incontinence, dysuria, frequency, hematuria and urgency.   Musculoskeletal:  Positive for arthralgias and joint swelling. Negative for gait problem and myalgias.   Integumentary:  Negative for rash.   Neurological:  Positive for light-headedness. Negative for dizziness, tremors, weakness, numbness and headaches.   Hematological:  Does not bruise/bleed easily.   Psychiatric/Behavioral:  Negative for dysphoric mood, sleep disturbance and suicidal ideas. The patient is not nervous/anxious.            Objective:      Vitals:    06/05/24 1509   BP: 110/70   Pulse: 70   SpO2: 97%   Weight: 92.4 kg (203 lb 9.6 oz)   Height: 5' 11" (1.803 m)     Physical Exam  Vitals and nursing note reviewed.   Constitutional:       General: He is not in acute distress.     Appearance: Normal appearance. He is well-developed. He is not ill-appearing.   HENT:      Head: Normocephalic and atraumatic.      Right Ear: Tympanic membrane and external ear normal.      Left Ear: Tympanic membrane and external ear normal.      Nose: Nose normal.      Mouth/Throat:      Lips: Pink.      Pharynx: Oropharynx is clear.   Eyes:      General: No scleral icterus.     Pupils: Pupils are equal, round, and reactive to light.   Neck:      Thyroid: No thyromegaly.      Vascular: No carotid bruit.   Cardiovascular:      Rate and Rhythm: Normal rate and regular rhythm.      Pulses:           Radial pulses are 2+ on the right side and 2+ on the left side.      Heart sounds: Normal heart sounds. No murmur heard.  Pulmonary:      Effort: Pulmonary effort is normal.      Breath sounds: Normal breath sounds.   Abdominal:      " General: Bowel sounds are normal.      Palpations: Abdomen is soft.      Tenderness: There is no abdominal tenderness.   Musculoskeletal:         General: Normal range of motion.      Cervical back: Normal range of motion.      Lumbar back: Normal. No spasms.      Right lower leg: No edema.      Left lower leg: No edema.   Skin:     General: Skin is warm and dry.      Capillary Refill: Capillary refill takes less than 2 seconds.          Neurological:      General: No focal deficit present.      Mental Status: He is alert and oriented to person, place, and time.      Cranial Nerves: No cranial nerve deficit.      Sensory: Sensation is intact.      Motor: No weakness.      Gait: Gait is intact.   Psychiatric:         Attention and Perception: Attention normal.         Mood and Affect: Mood normal.         Speech: Speech normal.         Behavior: Behavior is cooperative.         Thought Content: Thought content does not include homicidal or suicidal ideation.           Assessment:       1. Encounter to establish care    2. Wellness examination    3. Ulcerative colitis with complication, unspecified location    4. Tick bite of abdomen, initial encounter    5. Fatigue, unspecified type    6. Gouty tophus    7. Abnormal finding of blood chemistry, unspecified    8. Encounter for screening for malignant neoplasm of prostate         Plan:       Encounter to establish care    Wellness examination  Comments:  follow up annually or as needed.  Orders:  -     CBC Auto Differential; Future; Expected date: 06/05/2024  -     Comprehensive Metabolic Panel; Future; Expected date: 06/05/2024  -     Lipid Panel; Future; Expected date: 06/05/2024  -     Hemoglobin A1C; Future; Expected date: 06/05/2024  -     T4, Free; Future; Expected date: 06/05/2024  -     TSH; Future; Expected date: 06/05/2024  -     Urinalysis, Reflex to Urine Culture Urine, Clean Catch; Future; Expected date: 06/05/2024    Ulcerative colitis with complication,  unspecified location  Comments:  historical. needs to establish with new GI  Orders:  -     Ambulatory referral/consult to Gastroenterology; Future; Expected date: 06/12/2024  -     Iron and TIBC; Future; Expected date: 06/05/2024  -     Ferritin; Future; Expected date: 06/05/2024    Tick bite of abdomen, initial encounter  Comments:  several ticks removed after hunting trip in [place] a few months ago.no concerning signs or symptoms following, expect fatigue. denies known rash  Orders:  -     LYME DISEASE ANTIBODY BY EIA; Future; Expected date: 06/05/2024    Fatigue, unspecified type  Comments:  labs for evaluation  Orders:  -     LYME DISEASE ANTIBODY BY EIA; Future; Expected date: 06/05/2024  -     Testosterone, Total, Males; Future; Expected date: 06/05/2024    Gouty tophus  Comments:  continue allopurinol. offered xray of hand.  Orders:  -     Uric Acid; Future; Expected date: 06/05/2024    Abnormal finding of blood chemistry, unspecified  Comments:  labs for evaluation  Orders:  -     Iron and TIBC; Future; Expected date: 06/05/2024  -     Ferritin; Future; Expected date: 06/05/2024  -     Hemoglobin A1C; Future; Expected date: 06/05/2024    Encounter for screening for malignant neoplasm of prostate  Comments:  due for screening for appropriate population  Orders:  -     PSA, Screening; Future; Expected date: 06/05/2024      Follow up in about 1 year (around 6/5/2025), or if symptoms worsen or fail to improve, for ANNUAL.        6/16/2024 Rakel Hughes

## 2024-06-10 NOTE — PROGRESS NOTES
Thyroid levels are normal.   Urine normal.   Not diabetic.  Cholesterol is fine.   Platelets are on the lower side of normal but ok, other blood counts are fine  Kidneys, liver, electrolytes all look good  Testosterone is good at 687- does not need replacement  Iron levels are normal  Uric acid (gout) in range of normal  Still waiting on results for Lyme disease panel but do not expect any abnormality.

## 2024-06-11 ENCOUNTER — TELEPHONE (OUTPATIENT)
Dept: FAMILY MEDICINE | Facility: CLINIC | Age: 59
End: 2024-06-11
Payer: COMMERCIAL

## 2024-06-11 DIAGNOSIS — A69.20 LYME DISEASE: Primary | ICD-10-CM

## 2024-06-11 LAB
ALBUMIN SERPL-MCNC: 4.1 G/DL (ref 3.6–5.1)
ALBUMIN/GLOB SERPL: 1.5 (CALC) (ref 1–2.5)
ALP SERPL-CCNC: 53 U/L (ref 35–144)
ALT SERPL-CCNC: 10 U/L (ref 9–46)
APPEARANCE UR: CLEAR
AST SERPL-CCNC: 14 U/L (ref 10–35)
B BURGDOR IGG SER QL IB: NEGATIVE
B BURGDOR IGM SER QL IB: POSITIVE
B BURGDOR18KD IGG SER QL IB: REACTIVE
B BURGDOR23KD IGG SER QL IB: ABNORMAL
B BURGDOR23KD IGM SER QL IB: REACTIVE
B BURGDOR28KD IGG SER QL IB: ABNORMAL
B BURGDOR30KD IGG SER QL IB: ABNORMAL
B BURGDOR39KD IGG SER QL IB: ABNORMAL
B BURGDOR39KD IGM SER QL IB: REACTIVE
B BURGDOR41KD IGG SER QL IB: REACTIVE
B BURGDOR41KD IGM SER QL IB: ABNORMAL
B BURGDOR45KD IGG SER QL IB: ABNORMAL
B BURGDOR58KD IGG SER QL IB: ABNORMAL
B BURGDOR66KD IGG SER QL IB: ABNORMAL
B BURGDOR93KD IGG SER QL IB: ABNORMAL
BACTERIA #/AREA URNS HPF: NORMAL /HPF
BACTERIA UR CULT: NORMAL
BASOPHILS # BLD AUTO: 78 CELLS/UL (ref 0–200)
BASOPHILS NFR BLD AUTO: 1.1 %
BILIRUB SERPL-MCNC: 0.8 MG/DL (ref 0.2–1.2)
BILIRUB UR QL STRIP: NEGATIVE
BUN SERPL-MCNC: 19 MG/DL (ref 7–25)
BUN/CREAT SERPL: NORMAL (CALC) (ref 6–22)
CALCIUM SERPL-MCNC: 9.2 MG/DL (ref 8.6–10.3)
CHLORIDE SERPL-SCNC: 105 MMOL/L (ref 98–110)
CHOLEST SERPL-MCNC: 164 MG/DL
CHOLEST/HDLC SERPL: 3.9 (CALC)
CO2 SERPL-SCNC: 29 MMOL/L (ref 20–32)
COLOR UR: YELLOW
CREAT SERPL-MCNC: 1.05 MG/DL (ref 0.7–1.3)
EGFR: 83 ML/MIN/1.73M2
EOSINOPHIL # BLD AUTO: 192 CELLS/UL (ref 15–500)
EOSINOPHIL NFR BLD AUTO: 2.7 %
ERYTHROCYTE [DISTWIDTH] IN BLOOD BY AUTOMATED COUNT: 13.1 % (ref 11–15)
FERRITIN SERPL-MCNC: 177 NG/ML (ref 38–380)
GLOBULIN SER CALC-MCNC: 2.7 G/DL (CALC) (ref 1.9–3.7)
GLUCOSE SERPL-MCNC: 83 MG/DL (ref 65–99)
GLUCOSE UR QL STRIP: NEGATIVE
HBA1C MFR BLD: 5.4 % OF TOTAL HGB
HCT VFR BLD AUTO: 49.6 % (ref 38.5–50)
HDLC SERPL-MCNC: 42 MG/DL
HGB BLD-MCNC: 16.5 G/DL (ref 13.2–17.1)
HGB UR QL STRIP: NEGATIVE
HYALINE CASTS #/AREA URNS LPF: NORMAL /LPF
IRON SATN MFR SERPL: 38 % (CALC) (ref 20–48)
IRON SERPL-MCNC: 104 MCG/DL (ref 50–180)
KETONES UR QL STRIP: NEGATIVE
LDLC SERPL CALC-MCNC: 103 MG/DL (CALC)
LEUKOCYTE ESTERASE UR QL STRIP: NEGATIVE
LYMPHOCYTES # BLD AUTO: 2002 CELLS/UL (ref 850–3900)
LYMPHOCYTES NFR BLD AUTO: 28.2 %
MCH RBC QN AUTO: 29.3 PG (ref 27–33)
MCHC RBC AUTO-ENTMCNC: 33.3 G/DL (ref 32–36)
MCV RBC AUTO: 87.9 FL (ref 80–100)
MONOCYTES # BLD AUTO: 682 CELLS/UL (ref 200–950)
MONOCYTES NFR BLD AUTO: 9.6 %
NEUTROPHILS # BLD AUTO: 4146 CELLS/UL (ref 1500–7800)
NEUTROPHILS NFR BLD AUTO: 58.4 %
NITRITE UR QL STRIP: NEGATIVE
NONHDLC SERPL-MCNC: 122 MG/DL (CALC)
PH UR STRIP: 7.5 [PH] (ref 5–8)
PLATELET # BLD AUTO: 138 THOUSAND/UL (ref 140–400)
PMV BLD REES-ECKER: 10.6 FL (ref 7.5–12.5)
POTASSIUM SERPL-SCNC: 4.4 MMOL/L (ref 3.5–5.3)
PROT SERPL-MCNC: 6.8 G/DL (ref 6.1–8.1)
PROT UR QL STRIP: NEGATIVE
PSA SERPL-MCNC: 3.99 NG/ML
RBC # BLD AUTO: 5.64 MILLION/UL (ref 4.2–5.8)
RBC #/AREA URNS HPF: NORMAL /HPF
SERVICE CMNT-IMP: NORMAL
SODIUM SERPL-SCNC: 140 MMOL/L (ref 135–146)
SP GR UR STRIP: 1.02 (ref 1–1.03)
SQUAMOUS #/AREA URNS HPF: NORMAL /HPF
T4 FREE SERPL-MCNC: 1.1 NG/DL (ref 0.8–1.8)
TESTOST SERPL-MCNC: 687 NG/DL (ref 250–827)
TIBC SERPL-MCNC: 275 MCG/DL (CALC) (ref 250–425)
TRIGL SERPL-MCNC: 91 MG/DL
TSH SERPL-ACNC: 2.57 MIU/L (ref 0.4–4.5)
URATE SERPL-MCNC: 6 MG/DL (ref 4–8)
WBC # BLD AUTO: 7.1 THOUSAND/UL (ref 3.8–10.8)
WBC #/AREA URNS HPF: NORMAL /HPF

## 2024-06-11 RX ORDER — DOXYCYCLINE 100 MG/1
100 CAPSULE ORAL 2 TIMES DAILY
Qty: 20 CAPSULE | Refills: 0 | Status: SHIPPED | OUTPATIENT
Start: 2024-06-11 | End: 2024-06-21

## 2024-06-11 NOTE — TELEPHONE ENCOUNTER
"Per Rakel, "Please let patient know his Lyme titer actually came back positive. I am sending him medication called doxycycline. It is an antibiotic. He needs to take it with food, and avoid direct sun exposure while taking, wear sunscreen and protective clothing. It is known to make it much easier to get a severe sunburn. I am also sending a referral to infectious disease specialist, Dr Ferguson. (Pronounced Darshana)."    Spoke with pt verbatim per Rakel. Pt voiced understanding. Pt asked for call from Rakel as he is concerned about the news.         "

## 2024-06-11 NOTE — TELEPHONE ENCOUNTER
----- Message from MILKA Flores-CNP sent at 6/10/2024  5:59 PM CDT -----  Thyroid levels are normal.   Urine normal.   Not diabetic.  Cholesterol is fine.   Platelets are on the lower side of normal but ok, other blood counts are fine  Kidneys, liver, electrolytes all look good  Testosterone is good at 687- does not need replacement  Iron levels are normal  Uric acid (gout) in range of normal  Still waiting on results for Lyme disease panel but do not expect any abnormality.

## 2024-08-05 ENCOUNTER — PATIENT OUTREACH (OUTPATIENT)
Dept: ADMINISTRATIVE | Facility: HOSPITAL | Age: 59
End: 2024-08-05
Payer: COMMERCIAL

## 2024-08-05 LAB — CRC RECOMMENDATION EXT: NORMAL

## 2024-10-12 ENCOUNTER — HOSPITAL ENCOUNTER (EMERGENCY)
Facility: HOSPITAL | Age: 59
Discharge: HOME | End: 2024-10-12
Attending: EMERGENCY MEDICINE
Payer: COMMERCIAL

## 2024-10-12 VITALS
HEIGHT: 71 IN | WEIGHT: 200 LBS | OXYGEN SATURATION: 98 % | HEART RATE: 64 BPM | RESPIRATION RATE: 18 BRPM | BODY MASS INDEX: 28 KG/M2 | SYSTOLIC BLOOD PRESSURE: 135 MMHG | DIASTOLIC BLOOD PRESSURE: 92 MMHG | TEMPERATURE: 98 F

## 2024-10-12 DIAGNOSIS — S61.452A ANIMAL BITE OF LEFT HAND, INITIAL ENCOUNTER: Primary | ICD-10-CM

## 2024-10-12 PROCEDURE — 90715 TDAP VACCINE 7 YRS/> IM: CPT

## 2024-10-12 PROCEDURE — 90472 IMMUNIZATION ADMIN EACH ADD: CPT

## 2024-10-12 PROCEDURE — 90675 RABIES VACCINE IM: CPT | Mod: JZ,JG

## 2024-10-12 PROCEDURE — 90375 RABIES IG IM/SC: CPT | Mod: JZ,JG

## 2024-10-12 PROCEDURE — 99284 EMERGENCY DEPT VISIT MOD MDM: CPT | Mod: 25

## 2024-10-12 PROCEDURE — 90471 IMMUNIZATION ADMIN: CPT | Mod: JG

## 2024-10-12 PROCEDURE — 63600175 PHARM REV CODE 636 W HCPCS

## 2024-10-12 RX ORDER — AMOXICILLIN AND CLAVULANATE POTASSIUM 400; 57 MG/5ML; MG/5ML
875 POWDER, FOR SUSPENSION ORAL 2 TIMES DAILY
Qty: 153 ML | Refills: 0 | Status: SHIPPED | OUTPATIENT
Start: 2024-10-12 | End: 2024-10-19

## 2024-10-12 RX ORDER — MUPIROCIN 20 MG/G
OINTMENT TOPICAL 3 TIMES DAILY
Qty: 15 G | Refills: 0 | Status: SHIPPED | OUTPATIENT
Start: 2024-10-12

## 2024-10-12 RX ADMIN — RABIES VACCINE 2.5 UNITS: KIT at 10:10

## 2024-10-12 RX ADMIN — RABIES IMMUNE GLOBULIN (HUMAN) 1800 UNITS: 300 INJECTION, SOLUTION INFILTRATION; INTRAMUSCULAR at 10:10

## 2024-10-12 RX ADMIN — CLOSTRIDIUM TETANI TOXOID ANTIGEN (FORMALDEHYDE INACTIVATED), CORYNEBACTERIUM DIPHTHERIAE TOXOID ANTIGEN (FORMALDEHYDE INACTIVATED), BORDETELLA PERTUSSIS TOXOID ANTIGEN (GLUTARALDEHYDE INACTIVATED), BORDETELLA PERTUSSIS FILAMENTOUS HEMAGGLUTININ ANTIGEN (FORMALDEHYDE INACTIVATED), BORDETELLA PERTUSSIS PERTACTIN ANTIGEN, AND BORDETELLA PERTUSSIS FIMBRIAE 2/3 ANTIGEN 0.5 ML: 5; 2; 2.5; 5; 3; 5 INJECTION, SUSPENSION INTRAMUSCULAR at 10:10

## 2024-10-12 NOTE — DISCHARGE INSTRUCTIONS

## 2024-10-12 NOTE — ED PROVIDER NOTES
Encounter Date: 10/12/2024       History     Chief Complaint   Patient presents with    Animal Bite     Pt is an  and trapped a coyote. He did not noose it tight enough and the coyote bit patients left hand through the glove. Tetanus shot is not up to date.     59-year-old male with a past medical history of COVID, ulcerative colitis, and colon polyps presents to the ED after an animal bite.  Patient states around 5:45 a.m. he was trapping a wild coyote when it became loose and bit his left hand through the glove.  No puncture wound in the glove that patient could identify.  Patient denies any current pain.  States it bit him on his right thumb looks more like a abrasion from the bite through the glove.  Unknown last tetanus shot.  Patient denies swelling, active bleeding, numbness, tingling, trouble swallowing, shortness breath, choking, apnea, stridor, chest pain, nausea, vomiting, headaches, dizziness, and lightheadedness.      Review of patient's allergies indicates:  No Known Allergies  Past Medical History:   Diagnosis Date    COVID 2021    Personal history of colonic polyps 08/02/2024    Roshan Phipps MD    Plantar fascia syndrome     Ulcerative colitis      Past Surgical History:   Procedure Laterality Date    COLONOSCOPY  08/02/2024    Roshan Phipps MD    ENDOSCOPIC PLANTAR FASCIOTOMY Left 07/14/2022    Procedure: FASCIOTOMY, PLANTAR, ENDOSCOPIC;  Surgeon: Ector Hernandez DPM;  Location: [facility];  Service: Podiatry;  Laterality: Left;  KIT    GALLBLADDER SURGERY      OTHER SURGICAL HISTORY       Family History   Problem Relation Name Age of Onset    Heart disease Father       Social History     Tobacco Use    Smoking status: Never     Passive exposure: Never    Smokeless tobacco: Never   Substance Use Topics    Alcohol use: Never    Drug use: Never     Review of Systems   HENT:  Negative for sore throat and trouble swallowing.    Respiratory:  Negative for apnea, choking, chest tightness,  shortness of breath and stridor.    Cardiovascular:  Negative for chest pain.   Gastrointestinal:  Negative for nausea and vomiting.   Musculoskeletal:  Negative for arthralgias, joint swelling and myalgias.   Skin:  Positive for wound (left hand animal bite).   Neurological:  Negative for dizziness, weakness, light-headedness and headaches.       Physical Exam     Initial Vitals [10/12/24 0807]   BP Pulse Resp Temp SpO2   (!) 152/94 63 18 97.7 °F (36.5 °C) 96 %      MAP       --         Physical Exam    Nursing note and vitals reviewed.  Constitutional: He appears well-developed and well-nourished. He is not diaphoretic. No distress.   HENT:   Head: Normocephalic and atraumatic.   Right Ear: External ear normal.   Left Ear: External ear normal.   Nose: Nose normal.   Eyes: Pupils are equal, round, and reactive to light. Right eye exhibits no discharge. Left eye exhibits no discharge. No scleral icterus.   Neck:   Normal range of motion.  Cardiovascular:            Pulses:       Radial pulses are 2+ on the left side.   Pulmonary/Chest: Effort normal. No respiratory distress.   Abdominal: He exhibits no distension.   Musculoskeletal:         General: Normal range of motion.      Left hand: No swelling, deformity, tenderness or bony tenderness. Normal range of motion. Normal strength. Normal sensation. Normal capillary refill. Normal pulse.      Cervical back: Normal range of motion.      Comments: Superficial 1 cm coyote bite to the base of left 1st dorsal MCP; no active bleeding. No gaping bites. No pain to palpation. Normal ROM. Normal sensation. 2+ radial pulse. No bony ttp. See picture below     Neurological: He is alert and oriented to person, place, and time. He has normal strength. No sensory deficit. GCS eye subscore is 4. GCS verbal subscore is 5. GCS motor subscore is 6.   Skin: Skin is dry. Capillary refill takes less than 2 seconds.         ED Course   Procedures  Labs Reviewed - No data to display        Imaging Results    None          Medications   Tdap vaccine injection 0.5 mL (0.5 mLs Intramuscular Given 10/12/24 1056)   rabies vaccine, PCEC injection 2.5 Units (2.5 Units Intramuscular Given 10/12/24 1052)   rabies immune globulin (PF) (HYPERRAB) injection 1,800 Units (1,800 Units Other Given 10/12/24 1038)     Medical Decision Making  59-year-old male with a past medical history of COVID, ulcerative colitis, and colon polyps presents to the ED after an animal bite.  Patient's chart and medical history reviewed.    Ddx:  Animal bite  Laceration  Wound check  Rabies     Patient's vitals reviewed.  Afebrile, no respiratory distress, and nontoxic-appearing in the ED. Patient had an superficial 1 cm coyote bite to the base of left 1st dorsal MCP; no active bleeding. No gaping bites. No pain to palpation. Normal ROM. Normal sensation. 2+ radial pulse. No bony ttp.  With shared decision-making patient would like rabies vaccines today.  Patient updated on tetanus.  Wound was cleaned and irrigated by nursing staff.  Patient given rabies vaccine and rabies immunoglobulin around wound.  Nonadherent bandage applied.  Patient will also be sent home with Augmentin for prophylactic treatment of animal bite.  Patient will return on day 3, 7, and 14 for rabies vaccine.  Instructed patient to keep the area clean and dry and watch out for signs of infection including erythema, warmth, pus discharge, and fevers at home. Patient agrees with this plan. Discussed with him strict return precautions, he verbalized understanding. Patient is stable for discharge.     Amount and/or Complexity of Data Reviewed  External Data Reviewed: notes.    Risk  Prescription drug management.                                      Clinical Impression:  Final diagnoses:  [W06.793C] Animal bite of left hand, initial encounter - [place] (Primary)          ED Disposition Condition    Discharge Stable          ED Prescriptions       Medication Sig  Dispense Start Date End Date Auth. Provider    amoxicillin-clavulanate (AUGMENTIN) 400-57 mg/5 mL SusR Take 10.9 mLs (872 mg total) by mouth 2 (two) times daily. for 7 days 153 mL 10/12/2024 10/19/2024 Holdsworth, Alayna, PA-C    mupirocin (BACTROBAN) 2 % ointment Apply topically 3 (three) times daily. 15 g 10/12/2024 -- Holdsworth, Alayna, PA-C          Follow-up Information       Follow up With Specialties Details Why Contact Info Additional Information    [facility] Emergency Dept Emergency Medicine In 3 days Rabies vaccine [address] [facility] [id number]  [phone] 1st floor    [facility] Emergency Dept Emergency Medicine In 7 days rabies vaccine [address]  [facility] [id number]  [phone] 1st floor    [facility] Emergency Dept Emergency Medicine In 14 days rabies vaccine [address] [facility] [address]  [phone] 1st floor             Holdsworth, Alayna, PA-C  10/12/24 1113

## 2024-10-14 DIAGNOSIS — T14.8XXA WILD ANIMAL BITE: Primary | ICD-10-CM

## 2024-10-14 RX ORDER — HEPARIN 100 UNIT/ML
500 SYRINGE INTRAVENOUS
Status: CANCELLED | OUTPATIENT
Start: 2024-10-14

## 2024-10-14 RX ORDER — SODIUM CHLORIDE 0.9 % (FLUSH) 0.9 %
10 SYRINGE (ML) INJECTION
Status: CANCELLED | OUTPATIENT
Start: 2024-10-14

## 2024-10-15 ENCOUNTER — INFUSION (OUTPATIENT)
Dept: INFUSION THERAPY | Facility: HOSPITAL | Age: 59
End: 2024-10-15
Payer: COMMERCIAL

## 2024-10-15 VITALS
SYSTOLIC BLOOD PRESSURE: 125 MMHG | OXYGEN SATURATION: 95 % | HEART RATE: 61 BPM | DIASTOLIC BLOOD PRESSURE: 88 MMHG | TEMPERATURE: 98 F

## 2024-10-15 DIAGNOSIS — T14.8XXA WILD ANIMAL BITE: Primary | ICD-10-CM

## 2024-10-15 PROCEDURE — 90675 RABIES VACCINE IM: CPT | Mod: JZ,JG

## 2024-10-15 PROCEDURE — 90471 IMMUNIZATION ADMIN: CPT | Mod: JG

## 2024-10-15 PROCEDURE — 96372 THER/PROPH/DIAG INJ SC/IM: CPT

## 2024-10-15 PROCEDURE — 63600175 PHARM REV CODE 636 W HCPCS: Mod: JZ,JG

## 2024-10-15 RX ORDER — HEPARIN 100 UNIT/ML
500 SYRINGE INTRAVENOUS
Status: CANCELLED | OUTPATIENT
Start: 2024-10-15

## 2024-10-15 RX ORDER — SODIUM CHLORIDE 0.9 % (FLUSH) 0.9 %
10 SYRINGE (ML) INJECTION
Status: CANCELLED | OUTPATIENT
Start: 2024-10-15

## 2024-10-15 RX ADMIN — RABIES VACCINE 2.5 UNITS: KIT at 03:10

## 2024-10-18 ENCOUNTER — INFUSION (OUTPATIENT)
Dept: INFUSION THERAPY | Facility: HOSPITAL | Age: 59
End: 2024-10-18
Payer: COMMERCIAL

## 2024-10-18 VITALS
DIASTOLIC BLOOD PRESSURE: 76 MMHG | HEART RATE: 59 BPM | OXYGEN SATURATION: 97 % | TEMPERATURE: 97 F | SYSTOLIC BLOOD PRESSURE: 117 MMHG

## 2024-10-18 DIAGNOSIS — T14.8XXA WILD ANIMAL BITE: Primary | ICD-10-CM

## 2024-10-18 PROCEDURE — 63600175 PHARM REV CODE 636 W HCPCS: Mod: JZ,JG

## 2024-10-18 PROCEDURE — 90675 RABIES VACCINE IM: CPT | Mod: JZ,JG

## 2024-10-18 PROCEDURE — 96372 THER/PROPH/DIAG INJ SC/IM: CPT

## 2024-10-18 PROCEDURE — 90471 IMMUNIZATION ADMIN: CPT | Mod: JG

## 2024-10-18 RX ORDER — SODIUM CHLORIDE 0.9 % (FLUSH) 0.9 %
10 SYRINGE (ML) INJECTION
OUTPATIENT
Start: 2024-10-18

## 2024-10-18 RX ORDER — HEPARIN 100 UNIT/ML
500 SYRINGE INTRAVENOUS
OUTPATIENT
Start: 2024-10-18

## 2024-10-18 RX ORDER — SODIUM CHLORIDE 0.9 % (FLUSH) 0.9 %
10 SYRINGE (ML) INJECTION
Status: DISCONTINUED | OUTPATIENT
Start: 2024-10-18 | End: 2024-10-18 | Stop reason: HOSPADM

## 2024-10-18 RX ORDER — HEPARIN 100 UNIT/ML
500 SYRINGE INTRAVENOUS
Status: DISCONTINUED | OUTPATIENT
Start: 2024-10-18 | End: 2024-10-18 | Stop reason: HOSPADM

## 2024-10-18 RX ADMIN — RABIES VACCINE 2.5 UNITS: KIT at 07:10

## 2024-10-18 NOTE — PLAN OF CARE
Problem: Adult Inpatient Plan of Care  Goal: Plan of Care Review  10/18/2024 0729 by Arlet Lezama RN  Outcome: Met  10/18/2024 0729 by Arlet Lezama RN  Outcome: Progressing  Goal: Patient-Specific Goal (Individualized)  10/18/2024 0729 by Arlet Lezama RN  Outcome: Met  10/18/2024 0729 by Arlet Lezama RN  Outcome: Progressing  Goal: Absence of Hospital-Acquired Illness or Injury  10/18/2024 0729 by Arlet Lezama RN  Outcome: Met  10/18/2024 0729 by Arlet Lezama RN  Outcome: Progressing  Goal: Optimal Comfort and Wellbeing  10/18/2024 0729 by Arlet Lezama RN  Outcome: Met  10/18/2024 0729 by Arlet Lezama RN  Outcome: Progressing  Goal: Readiness for Transition of Care  10/18/2024 0729 by Arlet Lezama RN  Outcome: Met  10/18/2024 0729 by Arlet Lezama RN  Outcome: Progressing

## 2024-10-25 ENCOUNTER — INFUSION (OUTPATIENT)
Dept: INFUSION THERAPY | Facility: HOSPITAL | Age: 59
End: 2024-10-25
Payer: COMMERCIAL

## 2024-10-25 VITALS
HEART RATE: 62 BPM | OXYGEN SATURATION: 97 % | SYSTOLIC BLOOD PRESSURE: 135 MMHG | DIASTOLIC BLOOD PRESSURE: 80 MMHG | TEMPERATURE: 98 F

## 2024-10-25 DIAGNOSIS — T14.8XXA WILD ANIMAL BITE: Primary | ICD-10-CM

## 2024-10-25 PROCEDURE — 90675 RABIES VACCINE IM: CPT | Mod: JZ,JG

## 2024-10-25 PROCEDURE — 63600175 PHARM REV CODE 636 W HCPCS: Mod: JZ,JG

## 2024-10-25 PROCEDURE — 96372 THER/PROPH/DIAG INJ SC/IM: CPT

## 2024-10-25 PROCEDURE — 90471 IMMUNIZATION ADMIN: CPT | Mod: JG

## 2024-10-25 RX ORDER — HEPARIN 100 UNIT/ML
500 SYRINGE INTRAVENOUS
OUTPATIENT
Start: 2024-10-25

## 2024-10-25 RX ORDER — SODIUM CHLORIDE 0.9 % (FLUSH) 0.9 %
10 SYRINGE (ML) INJECTION
Status: DISCONTINUED | OUTPATIENT
Start: 2024-10-25 | End: 2024-10-25 | Stop reason: HOSPADM

## 2024-10-25 RX ORDER — SODIUM CHLORIDE 0.9 % (FLUSH) 0.9 %
10 SYRINGE (ML) INJECTION
OUTPATIENT
Start: 2024-10-25

## 2024-10-25 RX ORDER — HEPARIN 100 UNIT/ML
500 SYRINGE INTRAVENOUS
Status: DISCONTINUED | OUTPATIENT
Start: 2024-10-25 | End: 2024-10-25 | Stop reason: HOSPADM

## 2024-10-25 RX ADMIN — RABIES VACCINE 2.5 UNITS: KIT at 02:10

## 2024-10-25 NOTE — PLAN OF CARE
Problem: Adult Inpatient Plan of Care  Goal: Plan of Care Review  10/25/2024 1521 by Arlet Lezama RN  Outcome: Met  10/25/2024 1521 by Arlet Lezama RN  Outcome: Progressing  Goal: Patient-Specific Goal (Individualized)  10/25/2024 1521 by Arlet Lezama RN  Outcome: Met  10/25/2024 1521 by Arlet Lezama RN  Outcome: Progressing  Goal: Absence of Hospital-Acquired Illness or Injury  10/25/2024 1521 by Arlet Lezama RN  Outcome: Met  10/25/2024 1521 by Arlet Lezama RN  Outcome: Progressing  Goal: Optimal Comfort and Wellbeing  10/25/2024 1521 by Arlet Lezama RN  Outcome: Met  10/25/2024 1521 by Arlet Lezama RN  Outcome: Progressing  Goal: Readiness for Transition of Care  10/25/2024 1521 by Arlet Lezama RN  Outcome: Met  10/25/2024 1521 by Arlet Lezama RN  Outcome: Progressing

## 2025-06-12 ENCOUNTER — OFFICE VISIT (OUTPATIENT)
Dept: FAMILY MEDICINE | Facility: CLINIC | Age: 60
End: 2025-06-12
Payer: COMMERCIAL

## 2025-06-12 VITALS
HEIGHT: 71 IN | BODY MASS INDEX: 28.79 KG/M2 | HEART RATE: 63 BPM | DIASTOLIC BLOOD PRESSURE: 76 MMHG | WEIGHT: 205.63 LBS | SYSTOLIC BLOOD PRESSURE: 106 MMHG | OXYGEN SATURATION: 96 %

## 2025-06-12 DIAGNOSIS — Z12.5 ENCOUNTER FOR SCREENING FOR MALIGNANT NEOPLASM OF PROSTATE: ICD-10-CM

## 2025-06-12 DIAGNOSIS — Z13.21 SCREENING FOR ENDOCRINE, NUTRITIONAL, METABOLIC AND IMMUNITY DISORDER: ICD-10-CM

## 2025-06-12 DIAGNOSIS — N52.9 ERECTILE DYSFUNCTION, UNSPECIFIED ERECTILE DYSFUNCTION TYPE: ICD-10-CM

## 2025-06-12 DIAGNOSIS — Z13.220 ENCOUNTER FOR SCREENING FOR LIPID DISORDER: ICD-10-CM

## 2025-06-12 DIAGNOSIS — Z13.29 SCREENING FOR ENDOCRINE, NUTRITIONAL, METABOLIC AND IMMUNITY DISORDER: ICD-10-CM

## 2025-06-12 DIAGNOSIS — Z13.0 SCREENING FOR ENDOCRINE, NUTRITIONAL, METABOLIC AND IMMUNITY DISORDER: ICD-10-CM

## 2025-06-12 DIAGNOSIS — B37.9 CANDIDA ALBICANS INFECTION: ICD-10-CM

## 2025-06-12 DIAGNOSIS — Z00.00 WELLNESS EXAMINATION: Primary | ICD-10-CM

## 2025-06-12 DIAGNOSIS — Z13.228 SCREENING FOR ENDOCRINE, NUTRITIONAL, METABOLIC AND IMMUNITY DISORDER: ICD-10-CM

## 2025-06-12 PROBLEM — R10.13 EPIGASTRIC PAIN: Status: ACTIVE | Noted: 2025-06-12

## 2025-06-12 PROBLEM — K30 FUNCTIONAL DYSPEPSIA: Status: ACTIVE | Noted: 2025-06-12

## 2025-06-12 PROBLEM — R11.0 NAUSEA: Status: ACTIVE | Noted: 2025-06-12

## 2025-06-12 PROBLEM — R10.12 LEFT UPPER QUADRANT PAIN: Status: ACTIVE | Noted: 2025-06-12

## 2025-06-12 PROBLEM — R19.4 CHANGE IN BOWEL HABITS: Status: ACTIVE | Noted: 2025-06-12

## 2025-06-12 PROBLEM — K51.50 CHRONIC LEFT-SIDED ULCERATIVE COLITIS: Status: ACTIVE | Noted: 2025-06-12

## 2025-06-12 PROCEDURE — 3074F SYST BP LT 130 MM HG: CPT | Mod: CPTII,S$GLB,,

## 2025-06-12 PROCEDURE — 3008F BODY MASS INDEX DOCD: CPT | Mod: CPTII,S$GLB,,

## 2025-06-12 PROCEDURE — 99396 PREV VISIT EST AGE 40-64: CPT | Mod: S$GLB,,,

## 2025-06-12 PROCEDURE — 1159F MED LIST DOCD IN RCRD: CPT | Mod: CPTII,S$GLB,,

## 2025-06-12 PROCEDURE — 3078F DIAST BP <80 MM HG: CPT | Mod: CPTII,S$GLB,,

## 2025-06-12 PROCEDURE — 1160F RVW MEDS BY RX/DR IN RCRD: CPT | Mod: CPTII,S$GLB,,

## 2025-06-12 RX ORDER — COLESEVELAM 180 1/1
TABLET ORAL
COMMUNITY

## 2025-06-12 RX ORDER — FLUTICASONE PROPIONATE 50 MCG
SPRAY, SUSPENSION (ML) NASAL
COMMUNITY

## 2025-06-12 RX ORDER — FLUCONAZOLE 200 MG/1
200 TABLET ORAL
Qty: 2 TABLET | Refills: 0 | Status: SHIPPED | OUTPATIENT
Start: 2025-06-12 | End: 2025-06-16

## 2025-06-12 RX ORDER — NYSTATIN 100000 U/G
CREAM TOPICAL 3 TIMES DAILY
Qty: 30 G | Refills: 1 | Status: SHIPPED | OUTPATIENT
Start: 2025-06-12 | End: 2025-06-19

## 2025-06-23 NOTE — PROGRESS NOTES
SUBJECTIVE:    Patient ID: Devan Celeste is a 59 y.o. male.    Chief Complaint: Annual Exam (Declines colo referral / saw Dr. Wilcox 08/05/2024, states repeat in 6 mo, patient declines colonoscopy  / left knee pain and swelling and pain on right shoulder, not taking anything for pain, knee pain started 2 weeks ago and shoulder pain 6 mo ago/ will like to discuss other issues with provider//mp)    HPI  History of Present Illness    CHIEF COMPLAINT:  Devan presents today for yearly visit and follow up of joint pain    MUSCULOSKELETAL:  He reports shoulder pain that started a couple months ago, described as intermittent, localized, and needle-like, particularly notable upon waking. He also reports knee pain that began a couple weeks ago after prolonged kneeling. The knee exhibits swelling, especially with bending. He has been using ice and Targeezy for knee pain, and ibuprofen for general pain relief without any GI issues. While the knee pain is improving, usual self-treatment methods have been less effective than previously.    SEXUAL AND REPRODUCTIVE HEALTH:  He reports erectile dysfunction with decreased frequency of erections (1-2 times per month) and difficulty achieving full erection during these episodes. He also reports a recurring red rash on the penile shaft and undersurface every 4-5 days, associated with burning sensation and severe perineal itching, distinct from hemorrhoid symptoms.    PAST MEDICAL HISTORY:  History of rabies prophylaxis following coyote bite.      ROS:  General: -fever, -chills, -fatigue, -weight gain, -weight loss  Eyes: -vision changes, -redness, -discharge  ENT: -ear pain, -nasal congestion, -sore throat  Cardiovascular: -chest pain, -palpitations, -lower extremity edema  Respiratory: -cough, -shortness of breath  Gastrointestinal: -abdominal pain, -nausea, -vomiting, -diarrhea, -constipation, -blood in stool  Genitourinary: -dysuria, -hematuria, -frequency  Musculoskeletal:  "+joint pain, -muscle pain, +pain with movement, +joint swelling  Skin: +rash, -lesion, +genital sores or pain, +skin redness, +genital lesions, +anal itching, +perineal itching  Neurological: -headache, -dizziness, -numbness, -tingling  Psychiatric: -anxiety, -depression, -sleep difficulty  Male Genitourinary: +erectile dysfunction         No visits with results within 6 Month(s) from this visit.   Latest known visit with results is:   Patient Outreach on 08/05/2024   Component Date Value Ref Range Status    CRC Recommendation External 08/05/2024 Repeat colonoscopy in 6 months   Final       Past Medical History:   Diagnosis Date    COVID 2021    Personal history of colonic polyps 08/02/2024    Roshan Phipps MD    Plantar fascia syndrome     Ulcerative colitis      Social History[1]  Past Surgical History:   Procedure Laterality Date    COLONOSCOPY  08/02/2024    Roshan Phipps MD    ENDOSCOPIC PLANTAR FASCIOTOMY Left 07/14/2022    Procedure: FASCIOTOMY, PLANTAR, ENDOSCOPIC;  Surgeon: Ector Hernandez DPM;  Location: [facility];  Service: Podiatry;  Laterality: Left;  KIT    GALLBLADDER SURGERY      OTHER SURGICAL HISTORY       Family History   Problem Relation Name Age of Onset    Heart disease Father         The 10-year CVD risk score (D'Agostino, et al., 2008) is: 8%    Values used to calculate the score:      Age: 59 years      Sex: Male      Diabetic: No      Tobacco smoker: No      Systolic Blood Pressure: 106 mmHg      Is BP treated: No      HDL Cholesterol: 42 mg/dL      Total Cholesterol: 164 mg/dL    Tests to Keep You Healthy    Colon Cancer Screening: DUE      Review of patient's allergies indicates:  No Known Allergies  Current Medications[2]    Review of Systems        Objective:      Vitals:    06/12/25 1511   BP: 106/76   Pulse: 63   SpO2: 96%   Weight: 93.3 kg (205 lb 9.6 oz)   Height: 5' 11" (1.803 m)     Physical Exam  Physical Exam    Constitutional: In no acute distress. Normal appearance. " Well-developed. Not ill-appearing.  HENT: Normocephalic. Atraumatic. External ears normal bilaterally. Nose normal. Normal lips. Oropharynx clear.  Eyes: No scleral icterus. Pupils are equal, round, and reactive to light.  Neck: No thyromegaly. No carotid bruit.  Cardiovascular: Normal rate and regular rhythm. Radial pulses are 2+ bilaterally. Normal heart sounds. No murmur heard.  Pulmonary: Pulmonary effort is normal. Normal breath sounds.  Abdomen: Bowel sounds are normal. Abdomen is soft. No abdominal tenderness.  Musculoskeletal: Normal range of motion at all joints. Normal range of motion of the cervical back. No lumbar spasms. No lower extremity edema bilaterally.  Skin: Warm. Dry. Capillary refill takes less than 2 seconds.  Neurological: No focal deficit present. Alert and oriented to person, place, and time. No cranial nerve deficit. Sensation intact. No motor weakness. Gait is intact.  Psychiatric: Attention normal. Mood normal. Speech normal. Behavior is cooperative. No homicidal ideation. No suicidal ideation.           Assessment:       1. Wellness examination    2. Screening for endocrine, nutritional, metabolic and immunity disorder    3. Encounter for screening for lipid disorder    4. Encounter for screening for malignant neoplasm of prostate    5. Erectile dysfunction, unspecified erectile dysfunction type    6. Candida albicans infection         Plan:       Wellness examination  -     CBC Auto Differential; Future; Expected date: 06/12/2025  -     Comprehensive Metabolic Panel; Future; Expected date: 06/12/2025  -     Hemoglobin A1C; Future; Expected date: 06/12/2025  -     Lipid Panel; Future; Expected date: 06/12/2025  -     Microalbumin/Creatinine Ratio, Urine; Future; Expected date: 06/12/2025  -     TSH w/reflex to FT4; Future; Expected date: 06/12/2025    Screening for endocrine, nutritional, metabolic and immunity disorder  -     Hemoglobin A1C; Future; Expected date:  06/12/2025    Encounter for screening for lipid disorder  -     Lipid Panel; Future; Expected date: 06/12/2025    Encounter for screening for malignant neoplasm of prostate  -     PSA, Screening; Future; Expected date: 06/12/2025    Erectile dysfunction, unspecified erectile dysfunction type  -     Testosterone, Total, Males; Future; Expected date: 06/12/2025    Candida albicans infection  -     fluconazole (DIFLUCAN) 200 MG Tab; Take 1 tablet (200 mg total) by mouth every 72 hours. for 2 doses  Dispense: 2 tablet; Refill: 0  -     nystatin (MYCOSTATIN) cream; Apply topically 3 (three) times daily. for 7 days  Dispense: 30 g; Refill: 1      Assessment & Plan    M25.511 Pain in right shoulder  M25.561 Pain in right knee  N52.9 Male erectile dysfunction, unspecified  B37.42 Candidal balanitis  M19.90 Unspecified osteoarthritis, unspecified site  Z20.3 Contact with and (suspected) exposure to rabies    RIGHT SHOULDER PAIN:  - Devan reports pain in the right shoulder, especially in the morning, but not constant throughout the day.  - Evaluated and determined it is likely joint-related arthritis rather than a rotator cuff injury.  - Recommend x-rays if the pain does not improve.  - Instructed the patient to follow up if joint pain worsens.    RIGHT KNEE PAIN:  - Devan reports knee pain that gets inflamed occasionally, especially after bending, and sometimes swells without fever.  - Currently using ice, topical treatments (Targeezy), Tylenol, and ibuprofen for management.  - Assessed as likely arthritis rather than acute injury.  - Recommend x-rays if the knee pain does not improve and instructed to follow up if symptoms worsen.    ERECTILE DYSFUNCTION:  - Devan reports intermittent erectile dysfunction occurring 1-2 times per month with difficulty achieving a full erection.  - No urinary symptoms or symptoms of prostate enlargement noted.  - Devan is not taking any medications that could cause erectile dysfunction.  -  Discussed potential causes including low testosterone, enlarged prostate, lifestyle factors, fatigue, stress, and alcohol use.  - Ordered PSA and testosterone levels as part of the evaluation.    CANDIDAL BALANITIS (GENITAL RASH):  - Devan reports recurring rash in the genital area, described as red and burning, occurring every 4-5 days.  - No blistering, oozing, odor, or oily discharge noted.  - Diagnosed as likely yeast infection (Candida albicans).  - Prescribed Diflucan 200 mg tablet to be taken once now and again in 3 days, along with nystatin antifungal cream to be applied to affected area 3 times daily for 7 days.  - Discussed risks of long-term oral antifungal use, including potential for elevated liver enzymes.  - Recommend using OTC antifungal powder (e.g., Z-sorb AF) for prevention.  - Instructed to contact the office if the rash does not resolve with prescribed treatment.    OSTEOARTHRITIS:  - Devan reports joint pain, particularly in the shoulder and knee, with occasional inflammation and swelling.  - Pain is not constant and varies in intensity, with no fever or heat in the knee.  - Assessed as likely arthritis rather than acute injury.  - Recommend x-rays if the joint pain does not improve.    RABIES EXPOSURE:  - Devan recalls past rabies exposure from a coyote bite and history of receiving rabies shots following the incident.  - Discussed the duration of rabies vaccination effectiveness and informed about yearly rabies vaccinations for pets.    FOLLOW-UP:  - Ordered wellness labs including cholesterol, PSA, CBC, thyroid panel, and testosterone level.         Follow up in about 1 year (around 6/12/2026), or if symptoms worsen or fail to improve, for ANNUAL.        This note was generated with the assistance of ambient listening technology. Verbal consent was obtained by the patient and accompanying visitor(s) for the recording of patient appointment to facilitate this note. I attest to having reviewed  and edited the generated note for accuracy, though some syntax or spelling errors may persist. Please contact the author of this note for any clarification.      6/22/2025 Rakel Hughes         [1]   Social History  Socioeconomic History    Marital status:    Tobacco Use    Smoking status: Never     Passive exposure: Never    Smokeless tobacco: Never   Substance and Sexual Activity    Alcohol use: Never    Drug use: Never   [2]   Current Outpatient Medications:     fluticasone propionate (FLONASE) 50 mcg/actuation nasal spray, 0, Disp: , Rfl:     colesevelam (WELCHOL) 625 mg tablet, 0 (Patient not taking: Reported on 6/12/2025), Disp: , Rfl:     mesalamine (LIALDA) 1.2 gram TbEC, , Disp: , Rfl:     mupirocin (BACTROBAN) 2 % ointment, Apply topically 3 (three) times daily. (Patient not taking: Reported on 6/12/2025), Disp: 15 g, Rfl: 0    nystatin (MYCOSTATIN) cream, Apply topically 3 (three) times daily. for 7 days, Disp: 30 g, Rfl: 1

## 2025-07-25 ENCOUNTER — TELEPHONE (OUTPATIENT)
Dept: FAMILY MEDICINE | Facility: CLINIC | Age: 60
End: 2025-07-25
Payer: COMMERCIAL

## 2025-07-25 NOTE — TELEPHONE ENCOUNTER
Left mess that fasting lab and urine is due as annual labs ordered at recent visit by Carina, orders at [facility], and to call with any questions.

## (undated) DEVICE — CUFF TOURNIQUET 18DUAL PRT 5921-218-235

## (undated) DEVICE — BANDAGE KERLIX   441106

## (undated) DEVICE — BANDAGE ACE STERILE 4 REB3114

## (undated) DEVICE — PAD BOVIE ADULT

## (undated) DEVICE — NEEDLE SAFETY ECLIPSE 25G 1-1/2IN 305767

## (undated) DEVICE — GLOVE BIOGEL PI ULTRA TOUCH GRAY SZ7.5

## (undated) DEVICE — Device

## (undated) DEVICE — APPLICATOR 6 Q-TIPS 2PK 8884541400

## (undated) DEVICE — SPONGE GAUZE 10S 4X4  442214

## (undated) DEVICE — TRAY LOWER EXTREMITY  SMHS029-05

## (undated) DEVICE — SYRINGE 10ML 302995

## (undated) DEVICE — SOLUTION IRRI NS BOTTLE 1000ML R5200-01

## (undated) DEVICE — SUTURE PROLENE 4-0 PS-2 18 8682H

## (undated) DEVICE — GAUZE VASELINE XEROFORM 5X9 8884433605